# Patient Record
Sex: FEMALE | Race: WHITE | NOT HISPANIC OR LATINO | Employment: UNEMPLOYED | URBAN - METROPOLITAN AREA
[De-identification: names, ages, dates, MRNs, and addresses within clinical notes are randomized per-mention and may not be internally consistent; named-entity substitution may affect disease eponyms.]

---

## 2017-07-23 ENCOUNTER — OFFICE VISIT (OUTPATIENT)
Dept: URGENT CARE | Facility: MEDICAL CENTER | Age: 42
End: 2017-07-23
Payer: COMMERCIAL

## 2017-07-23 ENCOUNTER — APPOINTMENT (OUTPATIENT)
Dept: LAB | Facility: HOSPITAL | Age: 42
End: 2017-07-23
Payer: COMMERCIAL

## 2017-07-23 DIAGNOSIS — R30.0 DYSURIA: ICD-10-CM

## 2017-07-23 PROCEDURE — 99203 OFFICE O/P NEW LOW 30 MIN: CPT

## 2017-07-23 PROCEDURE — 87077 CULTURE AEROBIC IDENTIFY: CPT

## 2017-07-23 PROCEDURE — 87186 SC STD MICRODIL/AGAR DIL: CPT

## 2017-07-23 PROCEDURE — 87086 URINE CULTURE/COLONY COUNT: CPT

## 2017-07-25 LAB — BACTERIA UR CULT: NORMAL

## 2018-11-12 ENCOUNTER — OFFICE VISIT (OUTPATIENT)
Dept: URGENT CARE | Facility: MEDICAL CENTER | Age: 43
End: 2018-11-12
Payer: COMMERCIAL

## 2018-11-12 VITALS
TEMPERATURE: 98.7 F | OXYGEN SATURATION: 99 % | HEIGHT: 60 IN | WEIGHT: 128 LBS | DIASTOLIC BLOOD PRESSURE: 70 MMHG | BODY MASS INDEX: 25.13 KG/M2 | SYSTOLIC BLOOD PRESSURE: 90 MMHG | HEART RATE: 77 BPM | RESPIRATION RATE: 20 BRPM

## 2018-11-12 DIAGNOSIS — R30.0 DYSURIA: Primary | ICD-10-CM

## 2018-11-12 LAB
SL AMB  POCT GLUCOSE, UA: ABNORMAL
SL AMB LEUKOCYTE ESTERASE,UA: ABNORMAL
SL AMB POCT BILIRUBIN,UA: ABNORMAL
SL AMB POCT BLOOD,UA: ABNORMAL
SL AMB POCT CLARITY,UA: ABNORMAL
SL AMB POCT COLOR,UA: ABNORMAL
SL AMB POCT KETONES,UA: ABNORMAL
SL AMB POCT NITRITE,UA: ABNORMAL
SL AMB POCT PH,UA: 6
SL AMB POCT SPECIFIC GRAVITY,UA: 1.01
SL AMB POCT URINE PROTEIN: 30
SL AMB POCT UROBILINOGEN: 0.2

## 2018-11-12 PROCEDURE — 81002 URINALYSIS NONAUTO W/O SCOPE: CPT | Performed by: PHYSICIAN ASSISTANT

## 2018-11-12 PROCEDURE — 99213 OFFICE O/P EST LOW 20 MIN: CPT | Performed by: PHYSICIAN ASSISTANT

## 2018-11-12 RX ORDER — NITROFURANTOIN 25; 75 MG/1; MG/1
100 CAPSULE ORAL 2 TIMES DAILY
Qty: 14 CAPSULE | Refills: 0 | Status: SHIPPED | OUTPATIENT
Start: 2018-11-12 | End: 2018-11-19

## 2018-11-12 RX ORDER — ESCITALOPRAM OXALATE 10 MG/1
TABLET ORAL
COMMUNITY
Start: 2018-09-25

## 2018-11-12 RX ORDER — ESCITALOPRAM OXALATE 10 MG/1
TABLET ORAL
COMMUNITY

## 2018-11-12 NOTE — PROGRESS NOTES
[facility]        NAME: Lina Dykes is a 37 y o  female  : 1975    MRN: [id number]  DATE: 2018  TIME: 5:52 PM    Assessment and Plan   Dysuria [R30 0]  1  Dysuria  POCT urine dip    nitrofurantoin (MACROBID) 100 mg capsule         Patient Instructions     Dysuria  macrobid twice daily x 7 days  Follow up with PCP in 3-5 days  Proceed to  ER if symptoms worsen  Chief Complaint     Chief Complaint   Patient presents with    Possible UTI         History of Present Illness       38 y/o female c/o dysuria, frequency and urgency  Denies fever, chills, n/v        Review of Systems   Review of Systems   Constitutional: Negative  HENT: Negative  Eyes: Negative  Respiratory: Negative  Negative for cough, chest tightness, shortness of breath, wheezing and stridor  Cardiovascular: Negative  Negative for chest pain, palpitations and leg swelling  Genitourinary: Positive for dysuria, frequency and urgency  Negative for enuresis and flank pain           Current Medications       Current Outpatient Prescriptions:     escitalopram (LEXAPRO) 10 mg tablet, TAKE 1 TABLET BY MOUTH EVERY DAY, Disp: , Rfl:     escitalopram (LEXAPRO) 10 mg tablet, Take by mouth, Disp: , Rfl:     nitrofurantoin (MACROBID) 100 mg capsule, Take 1 capsule (100 mg total) by mouth 2 (two) times a day for 7 days, Disp: 14 capsule, Rfl: 0    Current Allergies     Allergies as of 2018 - Reviewed 2018   Allergen Reaction Noted    Bupropion Swelling 2015            The following portions of the patient's history were reviewed and updated as appropriate: allergies, current medications, past family history, past medical history, past social history, past surgical history and problem list      Past Medical History:   Diagnosis Date    Depression     Shingles        Past Surgical History:   Procedure Laterality Date     SECTION      DILATION AND CURETTAGE OF UTERUS         Family History   Problem Relation Age of Onset    Osteoporosis Mother     Hyperlipidemia Mother     Heart disease Father     Hypertension Father          Medications have been verified  Objective   BP 90/70   Pulse 77   Temp 98 7 °F (37 1 °C) (Temporal)   Resp 20   Ht 5' (1 524 m)   Wt 58 1 kg (128 lb)   LMP 11/04/2018 (Exact Date)   SpO2 99%   BMI 25 00 kg/m²        Physical Exam     Physical Exam   Constitutional: She appears well-developed and well-nourished  HENT:   Head: Normocephalic and atraumatic  Right Ear: External ear normal    Left Ear: External ear normal    Nose: Nose normal    Mouth/Throat: Oropharynx is clear and moist  No oropharyngeal exudate  Neck: Normal range of motion  Neck supple  Cardiovascular: Normal rate, regular rhythm, normal heart sounds and intact distal pulses  Pulmonary/Chest: Effort normal and breath sounds normal  No respiratory distress  She has no wheezes  She has no rales  She exhibits no tenderness  Abdominal: Soft  Bowel sounds are normal  She exhibits no distension and no mass  There is no tenderness  There is no rebound and no guarding  Lymphadenopathy:     She has no cervical adenopathy

## 2018-11-12 NOTE — PATIENT INSTRUCTIONS
Dysuria  macrobid twice daily x 7 days  Follow up with PCP in 3-5 days  Proceed to  ER if symptoms worsen  Dysuria   WHAT YOU NEED TO KNOW:   Dysuria is difficulty urinating, or pain, burning, or discomfort with urination  Dysuria is usually a symptom of another problem  DISCHARGE INSTRUCTIONS:   Return to the emergency department if:   · You have severe back, side, or abdominal pain  · You have fever and shaking chills  · You vomit several times in a row  Contact your healthcare provider if:   · Your symptoms do not go away, even after treatment  · You have questions or concerns about your condition or care  Medicines:   · Medicines  may be given to help treat a bacterial infection or help decrease bladder spasms  · Take your medicine as directed  Contact your healthcare provider if you think your medicine is not helping or if you have side effects  Tell him of her if you are allergic to any medicine  Keep a list of the medicines, vitamins, and herbs you take  Include the amounts, and when and why you take them  Bring the list or the pill bottles to follow-up visits  Carry your medicine list with you in case of an emergency  Follow up with your healthcare provider as directed: Your healthcare provider may also refer you to a urologist or nephrologist to have additional testing  Write down your questions so you remember to ask them during your visits  Manage your dysuria:   · Drink more liquids  Liquids help flush out bacteria that may be causing an infection  Ask your healthcare provider how much liquid to drink each day and which liquids are best for you  · Take sitz baths as directed  Fill a bathtub with 4 to 6 inches of warm water  You may also use a sitz bath pan that fits over a toilet  Sit in the sitz bath for 20 minutes  Do this 2 to 3 times a day, or as directed  The warm water can help decrease pain and swelling     © 2017 [employer] Information is for End User's use only and may not be sold, redistributed or otherwise used for commercial purposes  All illustrations and images included in CareNotes® are the copyrighted property of A [employer] M [employer]  or Vishal Kelley  The above information is an  only  It is not intended as medical advice for individual conditions or treatments  Talk to your doctor, nurse or pharmacist before following any medical regimen to see if it is safe and effective for you

## 2022-10-06 ENCOUNTER — HOSPITAL ENCOUNTER (EMERGENCY)
Facility: HOSPITAL | Age: 47
End: 2022-10-07
Attending: EMERGENCY MEDICINE
Payer: COMMERCIAL

## 2022-10-06 DIAGNOSIS — F10.10 ALCOHOL ABUSE: ICD-10-CM

## 2022-10-06 DIAGNOSIS — Z00.8 MEDICAL CLEARANCE FOR PSYCHIATRIC ADMISSION: ICD-10-CM

## 2022-10-06 DIAGNOSIS — F32.A DEPRESSION: Primary | ICD-10-CM

## 2022-10-06 DIAGNOSIS — R45.851 SUICIDAL IDEATIONS: ICD-10-CM

## 2022-10-06 LAB
ALBUMIN SERPL BCP-MCNC: 4.9 G/DL (ref 3.5–5)
ALP SERPL-CCNC: 49 U/L (ref 34–104)
ALT SERPL W P-5'-P-CCNC: 6 U/L (ref 7–52)
AMPHETAMINES SERPL QL SCN: NEGATIVE
ANION GAP SERPL CALCULATED.3IONS-SCNC: 14 MMOL/L (ref 4–13)
APAP SERPL-MCNC: <10 UG/ML (ref 10–20)
AST SERPL W P-5'-P-CCNC: 20 U/L (ref 13–39)
BARBITURATES UR QL: NEGATIVE
BASOPHILS # BLD AUTO: 0.02 THOUSANDS/ΜL (ref 0–0.1)
BASOPHILS NFR BLD AUTO: 0 % (ref 0–1)
BENZODIAZ UR QL: NEGATIVE
BILIRUB SERPL-MCNC: 0.25 MG/DL (ref 0.2–1)
BILIRUB UR QL STRIP: NEGATIVE
BUN SERPL-MCNC: 11 MG/DL (ref 5–25)
CALCIUM SERPL-MCNC: 9.2 MG/DL (ref 8.4–10.2)
CHLORIDE SERPL-SCNC: 108 MMOL/L (ref 96–108)
CLARITY UR: CLEAR
CO2 SERPL-SCNC: 21 MMOL/L (ref 21–32)
COCAINE UR QL: NEGATIVE
COLOR UR: COLORLESS
CREAT SERPL-MCNC: 0.75 MG/DL (ref 0.6–1.3)
EOSINOPHIL # BLD AUTO: 0.05 THOUSAND/ΜL (ref 0–0.61)
EOSINOPHIL NFR BLD AUTO: 1 % (ref 0–6)
ERYTHROCYTE [DISTWIDTH] IN BLOOD BY AUTOMATED COUNT: 13.9 % (ref 11.6–15.1)
ETHANOL EXG-MCNC: 0.13 MG/DL
ETHANOL EXG-MCNC: 0.17 MG/DL
ETHANOL EXG-MCNC: 0.21 MG/DL
ETHANOL EXG-MCNC: 0.24 MG/DL
ETHANOL SERPL-MCNC: 316 MG/DL
EXT PREG TEST URINE: NEGATIVE
EXT. CONTROL ED NAV: NORMAL
FLUAV RNA RESP QL NAA+PROBE: NEGATIVE
FLUBV RNA RESP QL NAA+PROBE: NEGATIVE
GFR SERPL CREATININE-BSD FRML MDRD: 93 ML/MIN/1.73SQ M
GLUCOSE SERPL-MCNC: 78 MG/DL (ref 65–140)
GLUCOSE UR STRIP-MCNC: NEGATIVE MG/DL
HCT VFR BLD AUTO: 40.3 % (ref 34.8–46.1)
HGB BLD-MCNC: 12.9 G/DL (ref 11.5–15.4)
HGB UR QL STRIP.AUTO: NEGATIVE
IMM GRANULOCYTES # BLD AUTO: 0.01 THOUSAND/UL (ref 0–0.2)
IMM GRANULOCYTES NFR BLD AUTO: 0 % (ref 0–2)
KETONES UR STRIP-MCNC: NEGATIVE MG/DL
LEUKOCYTE ESTERASE UR QL STRIP: NEGATIVE
LYMPHOCYTES # BLD AUTO: 1.7 THOUSANDS/ΜL (ref 0.6–4.47)
LYMPHOCYTES NFR BLD AUTO: 28 % (ref 14–44)
MCH RBC QN AUTO: 27.2 PG (ref 26.8–34.3)
MCHC RBC AUTO-ENTMCNC: 32 G/DL (ref 31.4–37.4)
MCV RBC AUTO: 85 FL (ref 82–98)
METHADONE UR QL: NEGATIVE
MONOCYTES # BLD AUTO: 0.26 THOUSAND/ΜL (ref 0.17–1.22)
MONOCYTES NFR BLD AUTO: 4 % (ref 4–12)
NEUTROPHILS # BLD AUTO: 3.99 THOUSANDS/ΜL (ref 1.85–7.62)
NEUTS SEG NFR BLD AUTO: 67 % (ref 43–75)
NITRITE UR QL STRIP: NEGATIVE
NRBC BLD AUTO-RTO: 0 /100 WBCS
OPIATES UR QL SCN: NEGATIVE
OXYCODONE+OXYMORPHONE UR QL SCN: NEGATIVE
PCP UR QL: NEGATIVE
PH UR STRIP.AUTO: 5 [PH]
PLATELET # BLD AUTO: 272 THOUSANDS/UL (ref 149–390)
PMV BLD AUTO: 9.2 FL (ref 8.9–12.7)
POTASSIUM SERPL-SCNC: 3.7 MMOL/L (ref 3.5–5.3)
PROT SERPL-MCNC: 7.9 G/DL (ref 6.4–8.4)
PROT UR STRIP-MCNC: NEGATIVE MG/DL
RBC # BLD AUTO: 4.75 MILLION/UL (ref 3.81–5.12)
RSV RNA RESP QL NAA+PROBE: NEGATIVE
SALICYLATES SERPL-MCNC: <5 MG/DL (ref 3–20)
SARS-COV-2 RNA RESP QL NAA+PROBE: NEGATIVE
SODIUM SERPL-SCNC: 143 MMOL/L (ref 135–147)
SP GR UR STRIP.AUTO: 1 (ref 1–1.03)
THC UR QL: NEGATIVE
TSH SERPL DL<=0.05 MIU/L-ACNC: 0.53 UIU/ML (ref 0.45–4.5)
UROBILINOGEN UR STRIP-ACNC: <2 MG/DL
WBC # BLD AUTO: 6.03 THOUSAND/UL (ref 4.31–10.16)

## 2022-10-06 PROCEDURE — 84443 ASSAY THYROID STIM HORMONE: CPT | Performed by: EMERGENCY MEDICINE

## 2022-10-06 PROCEDURE — 80179 DRUG ASSAY SALICYLATE: CPT | Performed by: EMERGENCY MEDICINE

## 2022-10-06 PROCEDURE — 80143 DRUG ASSAY ACETAMINOPHEN: CPT | Performed by: EMERGENCY MEDICINE

## 2022-10-06 PROCEDURE — 82075 ASSAY OF BREATH ETHANOL: CPT | Performed by: EMERGENCY MEDICINE

## 2022-10-06 PROCEDURE — 99285 EMERGENCY DEPT VISIT HI MDM: CPT | Performed by: EMERGENCY MEDICINE

## 2022-10-06 PROCEDURE — 85025 COMPLETE CBC W/AUTO DIFF WBC: CPT | Performed by: EMERGENCY MEDICINE

## 2022-10-06 PROCEDURE — 0241U HB NFCT DS VIR RESP RNA 4 TRGT: CPT | Performed by: EMERGENCY MEDICINE

## 2022-10-06 PROCEDURE — 81003 URINALYSIS AUTO W/O SCOPE: CPT | Performed by: EMERGENCY MEDICINE

## 2022-10-06 PROCEDURE — 80053 COMPREHEN METABOLIC PANEL: CPT | Performed by: EMERGENCY MEDICINE

## 2022-10-06 PROCEDURE — 81025 URINE PREGNANCY TEST: CPT | Performed by: EMERGENCY MEDICINE

## 2022-10-06 PROCEDURE — 80307 DRUG TEST PRSMV CHEM ANLYZR: CPT | Performed by: EMERGENCY MEDICINE

## 2022-10-06 PROCEDURE — 93005 ELECTROCARDIOGRAM TRACING: CPT

## 2022-10-06 PROCEDURE — 82077 ASSAY SPEC XCP UR&BREATH IA: CPT | Performed by: EMERGENCY MEDICINE

## 2022-10-06 PROCEDURE — 99285 EMERGENCY DEPT VISIT HI MDM: CPT

## 2022-10-06 PROCEDURE — 36415 COLL VENOUS BLD VENIPUNCTURE: CPT | Performed by: EMERGENCY MEDICINE

## 2022-10-06 NOTE — ED PROVIDER NOTES
History  Chief Complaint   Patient presents with    Psychiatric Evaluation     Pt was brought in by her  and her friends  +SI with plan  Plan to trap herself in her car and have carbon monoxide poisoning  No HI  Has access to guns at home  Pt has been drinking alcohol, approx 3 [facility] [employer] [facility]  24-year-old female comes in for psychiatric evaluation  Patient has a history of major depressive disorder  And has made several concerning statements to her  and friends about killing herself  Patient states that she has multiple triggers that make her depression worse  Patient states that she has access to guns at home  She also states that she would trap herself in her car in her garage die from carbon monoxide poisoning  Patient states that she has also been drinking alcohol today  Patient denies taking any other illicit drugs  Patient denies any previous inpatient admission      History provided by:  Patient   used: No    Psychiatric Evaluation  Presenting symptoms: depression, suicidal thoughts and suicidal threats    Presenting symptoms: no agitation    Patient accompanied by:  Family member ( and 2 best friends)  Degree of incapacity (severity):  Severe  Onset quality:  Gradual  Timing:  Constant  Progression:  Worsening  Chronicity:  Recurrent  Context: stressful life event    Treatment compliance: All of the time  Worsened by:  Alcohol  Ineffective treatments:  None tried  Associated symptoms: anxiety, feelings of worthlessness and poor judgment    Associated symptoms: no abdominal pain, no chest pain, no fatigue and no headaches    Risk factors: hx of mental illness        Prior to Admission Medications   Prescriptions Last Dose Informant Patient Reported?  Taking?   escitalopram (LEXAPRO) 10 mg tablet  Self Yes No   Sig: TAKE 1 TABLET BY MOUTH EVERY DAY   escitalopram (LEXAPRO) 10 mg tablet  Self Yes No   Sig: Take by mouth      Facility-Administered Medications: None       Past Medical History:   Diagnosis Date    Depression     Shingles        Past Surgical History:   Procedure Laterality Date     SECTION      DILATION AND CURETTAGE OF UTERUS         Family History   Problem Relation Age of Onset    Osteoporosis Mother     Hyperlipidemia Mother     Heart disease Father     Hypertension Father      I have reviewed and agree with the history as documented  E-Cigarette/Vaping     E-Cigarette/Vaping Substances     Social History     Tobacco Use    Smoking status: Current Every Day Smoker     Packs/day: 0 50     Years: 25 00     Pack years: 12 50     Types: Cigarettes    Smokeless tobacco: Never Used       Review of Systems   Constitutional: Negative for fatigue and fever  HENT: Negative for congestion and ear pain  Eyes: Negative for discharge and redness  Respiratory: Negative for apnea, cough, shortness of breath and wheezing  Cardiovascular: Negative for chest pain  Gastrointestinal: Negative for abdominal pain and diarrhea  Endocrine: Negative for cold intolerance and polydipsia  Genitourinary: Negative for difficulty urinating and hematuria  Musculoskeletal: Negative for arthralgias and back pain  Skin: Negative for color change and rash  Allergic/Immunologic: Negative for environmental allergies and immunocompromised state  Neurological: Negative for numbness and headaches  Hematological: Negative for adenopathy  Does not bruise/bleed easily  Psychiatric/Behavioral: Positive for suicidal ideas  Negative for agitation and behavioral problems  The patient is nervous/anxious  Physical Exam  Physical Exam  Vitals and nursing note reviewed  Constitutional:       Appearance: Normal appearance  She is well-developed  She is not toxic-appearing  HENT:      Head: Normocephalic and atraumatic        Right Ear: Tympanic membrane and external ear normal       Left Ear: Tympanic membrane and external ear normal       Nose: Nose normal  No nasal deformity or rhinorrhea  Mouth/Throat:      Dentition: Normal dentition  Pharynx: Uvula midline  Eyes:      General: Lids are normal          Right eye: No discharge  Left eye: No discharge  Conjunctiva/sclera: Conjunctivae normal       Pupils: Pupils are equal, round, and reactive to light  Neck:      Vascular: No carotid bruit or JVD  Trachea: Trachea normal    Cardiovascular:      Rate and Rhythm: Normal rate and regular rhythm  No extrasystoles are present  Chest Wall: PMI is not displaced  Pulses: Normal pulses  Pulmonary:      Effort: Pulmonary effort is normal  No accessory muscle usage or respiratory distress  Breath sounds: Normal breath sounds  No wheezing, rhonchi or rales  Abdominal:      General: Bowel sounds are normal       Palpations: Abdomen is soft  Abdomen is not rigid  There is no mass  Tenderness: There is no abdominal tenderness  There is no guarding or rebound  Musculoskeletal:      Right shoulder: No swelling, deformity or bony tenderness  Normal range of motion  Cervical back: Normal range of motion and neck supple  No deformity, tenderness or bony tenderness  Lymphadenopathy:      Cervical: No cervical adenopathy  Skin:     General: Skin is warm and dry  Findings: No rash  Neurological:      Mental Status: She is alert and oriented to person, place, and time  GCS: GCS eye subscore is 4  GCS verbal subscore is 5  GCS motor subscore is 6  Cranial Nerves: No cranial nerve deficit  Sensory: No sensory deficit  Deep Tendon Reflexes: Reflexes are normal and symmetric  Psychiatric:         Mood and Affect: Mood is depressed  Affect is labile  Speech: Speech is slurred  Behavior: Behavior is cooperative  Thought Content: Thought content includes suicidal ideation  Thought content includes suicidal plan           Judgment: Judgment is impulsive and inappropriate  Vital Signs  ED Triage Vitals   Temperature Pulse Respirations Blood Pressure SpO2   10/06/22 1423 10/06/22 1423 10/06/22 1423 10/06/22 1423 10/06/22 1423   98 °F (36 7 °C) (!) 116 20 117/79 99 %      Temp Source Heart Rate Source Patient Position - Orthostatic VS BP Location FiO2 (%)   10/06/22 1423 10/06/22 1423 10/06/22 2251 10/06/22 1423 --   Oral Monitor Lying Left arm       Pain Score       --                  Vitals:    10/06/22 1423 10/06/22 2251   BP: 117/79 102/59   Pulse: (!) 116 94   Patient Position - Orthostatic VS:  Lying         Visual Acuity      ED Medications  Medications - No data to display    Diagnostic Studies  Results Reviewed     Procedure Component Value Units Date/Time    POCT alcohol breath test [[id number]]  (Abnormal) Resulted: 10/06/22 2224    Lab Status: Edited Result - FINAL Updated: 10/06/22 2226     EXTBreath Alcohol 0 128    FLU/RSV/COVID - if FLU/RSV clinically relevant [[id number]]  (Normal) Collected: 10/06/22 2028    Lab Status: Final result Specimen: Nares from Nose Updated: 10/06/22 2109     SARS-CoV-2 Negative     INFLUENZA A PCR Negative     INFLUENZA B PCR Negative     RSV PCR Negative    Narrative:      FOR PEDIATRIC PATIENTS - copy/paste COVID Guidelines URL to browser: [email or web address]    SARS-CoV-2 assay is a Nucleic Acid Amplification assay intended for the  qualitative detection of nucleic acid from SARS-CoV-2 in nasopharyngeal  swabs  Results are for the presumptive identification of SARS-CoV-2 RNA  Positive results are indicative of infection with SARS-CoV-2, the virus  causing COVID-19, but do not rule out bacterial infection or co-infection  with other viruses  Laboratories within the [place] and its  territories are required to report all positive results to the appropriate  public health authorities   Negative results do not preclude SARS-CoV-2  infection and should not be used as the sole basis for treatment or other  patient management decisions  Negative results must be combined with  clinical observations, patient history, and epidemiological information  This test has not been FDA cleared or approved  This test has been authorized by FDA under an Emergency Use Authorization  (EUA)  This test is only authorized for the duration of time the  declaration that circumstances exist justifying the authorization of the  emergency use of an in vitro diagnostic tests for detection of SARS-CoV-2  virus and/or diagnosis of COVID-19 infection under section 564(b)(1) of  the Act, 21 U  S C  110XGX-8(Q)(8), unless the authorization is terminated  or revoked sooner  The test has been validated but independent review by FDA  and CLIA is pending  Test performed using [employer] GeneXpert: This RT-PCR assay targets N2,  a region unique to SARS-CoV-2  A conserved region in the E-gene was chosen  for pan-Sarbecovirus detection which includes SARS-CoV-2  According to CMS-2020-01-R, this platform meets the definition of high-throughput technology  POCT alcohol breath test [[id number]]  (Normal) Resulted: 10/06/22 2028    Lab Status: Final result Updated: 10/06/22 2028     EXTBreath Alcohol 0 168    POCT alcohol breath test [[id number]]  (Normal) Resulted: 10/06/22 1805    Lab Status: Final result Updated: 10/06/22 1806     EXTBreath Alcohol 0 208    Rapid drug screen, urine [[id number]]  (Normal) Collected: 10/06/22 1611    Lab Status: Final result Specimen: Urine, Clean Catch Updated: 10/06/22 1643     Amph/Meth UR Negative     Barbiturate Ur Negative     Benzodiazepine Urine Negative     Cocaine Urine Negative     Methadone Urine Negative     Opiate Urine Negative     PCP Ur Negative     THC Urine Negative     Oxycodone Urine Negative    Narrative:      FOR MEDICAL PURPOSES ONLY  IF CONFIRMATION NEEDED PLEASE CONTACT THE LAB WITHIN 5 DAYS      Drug Screen Cutoff Levels:  AMPHETAMINE/METHAMPHETAMINES  1000 ng/mL  BARBITURATES     200 ng/mL  BENZODIAZEPINES     200 ng/mL  COCAINE      300 ng/mL  METHADONE      300 ng/mL  OPIATES      300 ng/mL  PHENCYCLIDINE     25 ng/mL  THC       50 ng/mL  OXYCODONE      100 ng/mL    TSH [[id number]]  (Normal) Collected: 10/06/22 1527    Lab Status: Final result Specimen: Blood from Arm, Right Updated: 10/06/22 1641     TSH 3RD GENERATON 0 534 uIU/mL     Narrative:      Patients undergoing fluorescein dye angiography may retain small amounts of fluorescein in the body for 48-72 hours post procedure  Samples containing fluorescein can produce falsely depressed TSH values  If the patient had this procedure,a specimen should be resubmitted post fluorescein clearance        UA w Reflex to Microscopic w Reflex to Culture [[id number]] Collected: 10/06/22 1611    Lab Status: Final result Specimen: Urine, Clean Catch Updated: 10/06/22 1621     Color, UA Colorless     Clarity, UA Clear     Specific [place], UA 1 003     pH, UA 5 0     Leukocytes, UA Negative     Nitrite, UA Negative     Protein, UA Negative mg/dl      Glucose, UA Negative mg/dl      Ketones, UA Negative mg/dl      Urobilinogen, UA <2 0 mg/dl      Bilirubin, UA Negative     Occult Blood, UA Negative    POCT pregnancy, urine [[id number]]  (Normal) Resulted: 10/06/22 1610    Lab Status: Final result Updated: 10/06/22 1611     EXT PREG TEST UR (Ref: Negative) negative     Control valid    Ethanol [[id number]]  (Abnormal) Collected: 10/06/22 1527    Lab Status: Final result Specimen: Blood from Arm, Right Updated: 10/06/22 1552     Ethanol Lvl 316 mg/dL     Comprehensive metabolic panel [[id number]]  (Abnormal) Collected: 10/06/22 1527    Lab Status: Final result Specimen: Blood from Arm, Right Updated: 10/06/22 1552     Sodium 143 mmol/L      Potassium 3 7 mmol/L      Chloride 108 mmol/L      CO2 21 mmol/L      ANION GAP 14 mmol/L      BUN 11 mg/dL      Creatinine 0 75 mg/dL      Glucose 78 mg/dL      Calcium 9 2 mg/dL      AST 20 U/L      ALT 6 U/L      Alkaline Phosphatase 49 U/L      Total Protein 7 9 g/dL      Albumin 4 9 g/dL      Total Bilirubin 0 25 mg/dL      eGFR 93 ml/min/1 73sq m     Narrative:      [place] guidelines for Chronic Kidney Disease (CKD):     Stage 1 with normal or high GFR (GFR > 90 mL/min/1 73 square meters)    Stage 2 Mild CKD (GFR = 60-89 mL/min/1 73 square meters)    Stage 3A Moderate CKD (GFR = 45-59 mL/min/1 73 square meters)    Stage 3B Moderate CKD (GFR = 30-44 mL/min/1 73 square meters)    Stage 4 Severe CKD (GFR = 15-29 mL/min/1 73 square meters)    Stage 5 End Stage CKD (GFR <15 mL/min/1 73 square meters)  Note: GFR calculation is accurate only with a steady state creatinine    Salicylate level [[id number]]  (Normal) Collected: 10/06/22 1527    Lab Status: Final result Specimen: Blood from Arm, Right Updated: 41/19/12 0115     Salicylate Lvl <5 mg/dL     Acetaminophen level-If concentration is detectable, please discuss with medical  on call   [[id number]]  (Abnormal) Collected: 10/06/22 1527    Lab Status: Final result Specimen: Blood from Arm, Right Updated: 10/06/22 1552     Acetaminophen Level <10 ug/mL     CBC and differential [[id number]] Collected: 10/06/22 1527    Lab Status: Final result Specimen: Blood from Arm, Right Updated: 10/06/22 1536     WBC 6 03 Thousand/uL      RBC 4 75 Million/uL      Hemoglobin 12 9 g/dL      Hematocrit 40 3 %      MCV 85 fL      MCH 27 2 pg      MCHC 32 0 g/dL      RDW 13 9 %      MPV 9 2 fL      Platelets 899 Thousands/uL      nRBC 0 /100 WBCs      Neutrophils Relative 67 %      Immat GRANS % 0 %      Lymphocytes Relative 28 %      Monocytes Relative 4 %      Eosinophils Relative 1 %      Basophils Relative 0 %      Neutrophils Absolute 3 99 Thousands/µL      Immature Grans Absolute 0 01 Thousand/uL      Lymphocytes Absolute 1 70 Thousands/µL      Monocytes Absolute 0 26 Thousand/µL Eosinophils Absolute 0 05 Thousand/µL      Basophils Absolute 0 02 Thousands/µL     POCT alcohol breath test [[id number]]  (Normal) Resulted: 10/06/22 1512    Lab Status: Final result Updated: 10/06/22 1512     EXTBreath Alcohol 0 238                 No orders to display              Procedures  ECG 12 Lead Documentation Only    Date/Time: 10/6/2022 3:17 PM  Performed by: Natalia Ramírez DO  Authorized by: Natalia Ramírez DO     Patient location:  ED  Rate:     ECG rate:  107  Rhythm:     Rhythm: sinus tachycardia               ED Course  ED Course as of 10/06/22 2341   Thu Oct 06, 2022   2305 Extensive discussion with  and 2 friends they are very concerned about patient's mental health  Made bleeding she needs to go in inpatient  Unfortunately patient remain intoxicated past 2300 hours tonight she will not be able to be seen by crisis until the morning  I have discussed her with the overnight physician  We both are in agreement that patient needs to be seen and evaluated by crisis and will not need inpatient care whether she signs a 201 or be initiated 302  MDM  Number of Diagnoses or Management Options  Depression: new and requires workup  Suicidal ideations: new and requires workup     Amount and/or Complexity of Data Reviewed  Clinical lab tests: ordered and reviewed  Tests in the radiology section of CPT®: ordered and reviewed  Tests in the medicine section of CPT®: ordered and reviewed    Patient Progress  Patient progress: other (comment)      Disposition  Final diagnoses:   Depression   Suicidal ideations     Time reflects when diagnosis was documented in both MDM as applicable and the Disposition within this note     Time User Action Codes Description Comment    10/6/2022 11:07 PM Maryse Anderson Matt Retort  A] Depression     10/6/2022 11:07 PM Kong Mika K Add [[id number] [id number]] Suicidal ideations       ED Disposition     None      Follow-up Information    None         Patient's Medications   Discharge Prescriptions    No medications on file       No discharge procedures on file      PDMP Review     None          ED Provider  Electronically Signed by           Jeannine Rivera DO  10/06/22 6553

## 2022-10-06 NOTE — LETTER
[address] [place] [address]  Dept: [phone]      EMTALA TRANSFER CONSENT    NAME Barb Shaw                                         1975                              MRN [id number]    I have been informed of my rights regarding examination, treatment, and transfer   by Dr Stephon Lewis DO    Benefits: Specialized equipment and/or services available at the receiving facility (Include comment)________________________, Continuity of care, Other benefits (Include comment)_______________________ (inpatient mental health 201)    Risks: Potential for delay in receiving treatment, Increased discomfort during transfer, Potential deterioration of medical condition, Possible worsening of condition or death during transfer      Consent for Transfer:  I acknowledge that my medical condition has been evaluated and explained to me by the emergency department physician or other qualified medical person and/or my attending physician, who has recommended that I be transferred to the service of  Accepting Physician: Dr Sera Ramírez at [address] [place] [address] Name, [address] : [address]  The above potential benefits of such transfer, the potential risks associated with such transfer, and the probable risks of not being transferred have been explained to me, and I fully understand them  The doctor has explained that, in my case, the benefits of transfer outweigh the risks  I agree to be transferred  I authorize the performance of emergency medical procedures and treatments upon me in both transit and upon arrival at the receiving facility  Additionally, I authorize the release of any and all medical records to the receiving facility and request they be transported with me, if possible  I understand that the safest mode of transportation during a medical emergency is an ambulance and that the Hospital advocates the use of this mode of transport  Risks of traveling to the receiving facility by car, including absence of medical control, life sustaining equipment, such as oxygen, and medical personnel has been explained to me and I fully understand them  (AMENA CORRECT BOX BELOW)  [X]  I consent to the stated transfer and to be transported by ambulance/helicopter  [  ]  I consent to the stated transfer, but refuse transportation by ambulance and accept full responsibility for my transportation by car  I understand the risks of non-ambulance transfers and I exonerate the Hospital and its staff from any deterioration in my condition that results from this refusal         X___________________________________________    DATE  10/07/22  TIME________  Signature of patient or legally responsible individual signing on patient behalf           RELATIONSHIP TO PATIENT_________________________          Provider Certification    NAME Camryn Ellison                                        [facility] 1975                              MRN [id number]    A medical screening exam was performed on the above named patient  Based on the examination:    Condition Necessitating Transfer The primary encounter diagnosis was Depression  Diagnoses of Suicidal ideations, Medical clearance for psychiatric admission, and Alcohol abuse were also pertinent to this visit      Patient Condition: The patient has been stabilized such that within reasonable medical probability, no material deterioration of the patient condition or the condition of the unborn child(diana) is likely to result from the transfer    Reason for Transfer: Level of Care needed not available at this facility, No bed available at level of patient's needs, Other (Include comment)____________________ (inpatient mental health 201)    Transfer Requirements: [address]   · Space available and qualified personnel available for treatment as acknowledged by Crisis [phone]  · Agreed to accept transfer and to provide appropriate medical treatment as acknowledged by       Dr Ronnie Bliss  · Appropriate medical records of the examination and treatment of the patient are provided at the time of transfer   [address] [facility], [address] _______  · Transfer will be performed by qualified personnel from [address] [place] [address]  and appropriate transfer equipment as required, including the use of necessary and appropriate life support measures  Provider Certification: I have examined the patient and explained the following risks and benefits of being transferred/refusing transfer to the patient/family:  General risk, such as traffic hazards, adverse weather conditions, rough terrain or turbulence, possible failure of equipment (including vehicle or aircraft), or consequences of actions of persons outside the control of the transport personnel, Unanticipated needs of medical equipment and personnel during transport, Risk of worsening condition, The possibility of a transport vehicle being unavailable, The patient is stable for psychiatric transfer because they are medically stable, and is protected from harming him/herself or others during transport      Based on these reasonable risks and benefits to the patient and/or the unborn child(diana), and based upon the information available at the time of the patients examination, I certify that the medical benefits reasonably to be expected from the provision of appropriate medical treatments at another medical facility outweigh the increasing risks, if any, to the individuals medical condition, and in the case of labor to the unborn child, from effecting the transfer      X____________________________________________ DATE 10/07/22        TIME_______      ORIGINAL - SEND TO MEDICAL RECORDS   COPY - SEND WITH PATIENT DURING TRANSFER

## 2022-10-06 NOTE — ED NOTES
[facility] [address]  10/06/22 [facility]  10/06/22 7478

## 2022-10-07 ENCOUNTER — HOSPITAL ENCOUNTER (INPATIENT)
Facility: HOSPITAL | Age: 47
LOS: 5 days | Discharge: HOME | DRG: 885 | End: 2022-10-12
Attending: STUDENT IN AN ORGANIZED HEALTH CARE EDUCATION/TRAINING PROGRAM | Admitting: STUDENT IN AN ORGANIZED HEALTH CARE EDUCATION/TRAINING PROGRAM
Payer: COMMERCIAL

## 2022-10-07 VITALS
DIASTOLIC BLOOD PRESSURE: 63 MMHG | RESPIRATION RATE: 16 BRPM | BODY MASS INDEX: 25 KG/M2 | HEIGHT: 60 IN | HEART RATE: 93 BPM | TEMPERATURE: 98 F | SYSTOLIC BLOOD PRESSURE: 119 MMHG | OXYGEN SATURATION: 100 %

## 2022-10-07 DIAGNOSIS — F32.2 SEVERE MAJOR DEPRESSION WITHOUT PSYCHOTIC FEATURES (HCC): Primary | ICD-10-CM

## 2022-10-07 DIAGNOSIS — Z78.9 ALCOHOL USE: ICD-10-CM

## 2022-10-07 DIAGNOSIS — F17.200 NICOTINE DEPENDENCE: ICD-10-CM

## 2022-10-07 DIAGNOSIS — Z00.8 MEDICAL CLEARANCE FOR PSYCHIATRIC ADMISSION: ICD-10-CM

## 2022-10-07 DIAGNOSIS — F10.10 ALCOHOL ABUSE: ICD-10-CM

## 2022-10-07 LAB
ATRIAL RATE: 107 BPM
P AXIS: 61 DEGREES
PR INTERVAL: 130 MS
QRS AXIS: 71 DEGREES
QRSD INTERVAL: 82 MS
QT INTERVAL: 346 MS
QTC INTERVAL: 461 MS
T WAVE AXIS: 0 DEGREES
VENTRICULAR RATE: 107 BPM

## 2022-10-07 PROCEDURE — 93010 ELECTROCARDIOGRAM REPORT: CPT | Performed by: INTERNAL MEDICINE

## 2022-10-07 RX ORDER — LORAZEPAM 2 MG/ML
2 INJECTION INTRAMUSCULAR EVERY 6 HOURS PRN
Status: CANCELLED | OUTPATIENT
Start: 2022-10-07

## 2022-10-07 RX ORDER — MAGNESIUM HYDROXIDE/ALUMINUM HYDROXICE/SIMETHICONE 120; 1200; 1200 MG/30ML; MG/30ML; MG/30ML
30 SUSPENSION ORAL EVERY 4 HOURS PRN
Status: DISCONTINUED | OUTPATIENT
Start: 2022-10-07 | End: 2022-10-12 | Stop reason: HOSPADM

## 2022-10-07 RX ORDER — OLANZAPINE 2.5 MG/1
5 TABLET ORAL
Status: CANCELLED | OUTPATIENT
Start: 2022-10-07

## 2022-10-07 RX ORDER — LORAZEPAM 2 MG/ML
2 INJECTION INTRAMUSCULAR EVERY 6 HOURS PRN
Status: DISCONTINUED | OUTPATIENT
Start: 2022-10-07 | End: 2022-10-12 | Stop reason: HOSPADM

## 2022-10-07 RX ORDER — IBUPROFEN 400 MG/1
400 TABLET ORAL EVERY 4 HOURS PRN
Status: CANCELLED | OUTPATIENT
Start: 2022-10-07

## 2022-10-07 RX ORDER — LORAZEPAM 1 MG/1
2 TABLET ORAL EVERY 4 HOURS PRN
Status: CANCELLED | OUTPATIENT
Start: 2022-10-07

## 2022-10-07 RX ORDER — BISACODYL 10 MG
10 SUPPOSITORY, RECTAL RECTAL DAILY PRN
Status: CANCELLED | OUTPATIENT
Start: 2022-10-07

## 2022-10-07 RX ORDER — OLANZAPINE 10 MG/1
5 INJECTION, POWDER, LYOPHILIZED, FOR SOLUTION INTRAMUSCULAR
Status: DISCONTINUED | OUTPATIENT
Start: 2022-10-07 | End: 2022-10-12 | Stop reason: HOSPADM

## 2022-10-07 RX ORDER — IBUPROFEN 600 MG/1
600 TABLET ORAL EVERY 6 HOURS PRN
Status: CANCELLED | OUTPATIENT
Start: 2022-10-07

## 2022-10-07 RX ORDER — BENZTROPINE MESYLATE 1 MG/1
1 TABLET ORAL
Status: DISCONTINUED | OUTPATIENT
Start: 2022-10-07 | End: 2022-10-12 | Stop reason: HOSPADM

## 2022-10-07 RX ORDER — DIPHENHYDRAMINE HYDROCHLORIDE 50 MG/ML
50 INJECTION INTRAMUSCULAR; INTRAVENOUS EVERY 6 HOURS PRN
Status: DISCONTINUED | OUTPATIENT
Start: 2022-10-07 | End: 2022-10-12 | Stop reason: HOSPADM

## 2022-10-07 RX ORDER — OLANZAPINE 10 MG/1
5 INJECTION, POWDER, LYOPHILIZED, FOR SOLUTION INTRAMUSCULAR
Status: CANCELLED | OUTPATIENT
Start: 2022-10-07

## 2022-10-07 RX ORDER — FOLIC ACID 1 MG/1
1 TABLET ORAL DAILY
Status: DISCONTINUED | OUTPATIENT
Start: 2022-10-07 | End: 2022-10-07 | Stop reason: HOSPADM

## 2022-10-07 RX ORDER — BISACODYL 10 MG
10 SUPPOSITORY, RECTAL RECTAL DAILY PRN
Status: DISCONTINUED | OUTPATIENT
Start: 2022-10-07 | End: 2022-10-07

## 2022-10-07 RX ORDER — OLANZAPINE 2.5 MG/1
2.5 TABLET ORAL
Status: CANCELLED | OUTPATIENT
Start: 2022-10-07

## 2022-10-07 RX ORDER — LANOLIN ALCOHOL/MO/W.PET/CERES
100 CREAM (GRAM) TOPICAL DAILY
Status: CANCELLED | OUTPATIENT
Start: 2022-10-07

## 2022-10-07 RX ORDER — HYDROXYZINE HYDROCHLORIDE 25 MG/1
50 TABLET, FILM COATED ORAL
Status: CANCELLED | OUTPATIENT
Start: 2022-10-07

## 2022-10-07 RX ORDER — LANOLIN ALCOHOL/MO/W.PET/CERES
100 CREAM (GRAM) TOPICAL DAILY
Status: DISCONTINUED | OUTPATIENT
Start: 2022-10-07 | End: 2022-10-07 | Stop reason: HOSPADM

## 2022-10-07 RX ORDER — ESCITALOPRAM OXALATE 10 MG/1
10 TABLET ORAL DAILY
Status: DISCONTINUED | OUTPATIENT
Start: 2022-10-07 | End: 2022-10-08

## 2022-10-07 RX ORDER — NICOTINE 21 MG/24HR
1 PATCH, TRANSDERMAL 24 HOURS TRANSDERMAL DAILY
Status: DISCONTINUED | OUTPATIENT
Start: 2022-10-07 | End: 2022-10-10

## 2022-10-07 RX ORDER — HYDROXYZINE HYDROCHLORIDE 25 MG/1
25 TABLET, FILM COATED ORAL
Status: DISCONTINUED | OUTPATIENT
Start: 2022-10-07 | End: 2022-10-12 | Stop reason: HOSPADM

## 2022-10-07 RX ORDER — HYDROXYZINE HYDROCHLORIDE 25 MG/1
25 TABLET, FILM COATED ORAL
Status: CANCELLED | OUTPATIENT
Start: 2022-10-07

## 2022-10-07 RX ORDER — TRAZODONE HYDROCHLORIDE 50 MG/1
50 TABLET ORAL
Status: CANCELLED | OUTPATIENT
Start: 2022-10-07

## 2022-10-07 RX ORDER — POLYETHYLENE GLYCOL 3350 17 G/17G
17 POWDER, FOR SOLUTION ORAL DAILY PRN
Status: CANCELLED | OUTPATIENT
Start: 2022-10-07

## 2022-10-07 RX ORDER — AMOXICILLIN 250 MG
1 CAPSULE ORAL DAILY PRN
Status: CANCELLED | OUTPATIENT
Start: 2022-10-07

## 2022-10-07 RX ORDER — OLANZAPINE 5 MG/1
5 TABLET ORAL
Status: DISCONTINUED | OUTPATIENT
Start: 2022-10-07 | End: 2022-10-12 | Stop reason: HOSPADM

## 2022-10-07 RX ORDER — BENZTROPINE MESYLATE 1 MG/ML
1 INJECTION INTRAMUSCULAR; INTRAVENOUS
Status: CANCELLED | OUTPATIENT
Start: 2022-10-07

## 2022-10-07 RX ORDER — HYDROXYZINE 50 MG/1
100 TABLET, FILM COATED ORAL
Status: DISCONTINUED | OUTPATIENT
Start: 2022-10-07 | End: 2022-10-12 | Stop reason: HOSPADM

## 2022-10-07 RX ORDER — OLANZAPINE 10 MG/1
2.5 INJECTION, POWDER, LYOPHILIZED, FOR SOLUTION INTRAMUSCULAR
Status: CANCELLED | OUTPATIENT
Start: 2022-10-07

## 2022-10-07 RX ORDER — DIPHENHYDRAMINE HYDROCHLORIDE 50 MG/ML
50 INJECTION INTRAMUSCULAR; INTRAVENOUS EVERY 6 HOURS PRN
Status: CANCELLED | OUTPATIENT
Start: 2022-10-07

## 2022-10-07 RX ORDER — IBUPROFEN 400 MG/1
800 TABLET ORAL EVERY 8 HOURS PRN
Status: CANCELLED | OUTPATIENT
Start: 2022-10-07

## 2022-10-07 RX ORDER — OLANZAPINE 10 MG/1
2.5 INJECTION, POWDER, LYOPHILIZED, FOR SOLUTION INTRAMUSCULAR
Status: DISCONTINUED | OUTPATIENT
Start: 2022-10-07 | End: 2022-10-12 | Stop reason: HOSPADM

## 2022-10-07 RX ORDER — FOLIC ACID 1 MG/1
1 TABLET ORAL DAILY
Status: DISCONTINUED | OUTPATIENT
Start: 2022-10-08 | End: 2022-10-12 | Stop reason: HOSPADM

## 2022-10-07 RX ORDER — AMOXICILLIN 250 MG
1 CAPSULE ORAL DAILY PRN
Status: DISCONTINUED | OUTPATIENT
Start: 2022-10-07 | End: 2022-10-12 | Stop reason: HOSPADM

## 2022-10-07 RX ORDER — BISACODYL 10 MG
10 SUPPOSITORY, RECTAL RECTAL DAILY PRN
Status: DISCONTINUED | OUTPATIENT
Start: 2022-10-07 | End: 2022-10-12 | Stop reason: HOSPADM

## 2022-10-07 RX ORDER — TRAZODONE HYDROCHLORIDE 50 MG/1
50 TABLET ORAL
Status: DISCONTINUED | OUTPATIENT
Start: 2022-10-07 | End: 2022-10-12 | Stop reason: HOSPADM

## 2022-10-07 RX ORDER — LORAZEPAM 1 MG/1
2 TABLET ORAL EVERY 4 HOURS PRN
Status: DISCONTINUED | OUTPATIENT
Start: 2022-10-07 | End: 2022-10-12 | Stop reason: HOSPADM

## 2022-10-07 RX ORDER — MINERAL OIL AND PETROLATUM 150; 830 MG/G; MG/G
1 OINTMENT OPHTHALMIC
Status: DISCONTINUED | OUTPATIENT
Start: 2022-10-07 | End: 2022-10-12 | Stop reason: HOSPADM

## 2022-10-07 RX ORDER — LANOLIN ALCOHOL/MO/W.PET/CERES
100 CREAM (GRAM) TOPICAL DAILY
Status: DISCONTINUED | OUTPATIENT
Start: 2022-10-08 | End: 2022-10-12 | Stop reason: HOSPADM

## 2022-10-07 RX ORDER — HYDROXYZINE 50 MG/1
50 TABLET, FILM COATED ORAL
Status: DISCONTINUED | OUTPATIENT
Start: 2022-10-07 | End: 2022-10-12 | Stop reason: HOSPADM

## 2022-10-07 RX ORDER — FOLIC ACID 1 MG/1
1 TABLET ORAL DAILY
Status: CANCELLED | OUTPATIENT
Start: 2022-10-07

## 2022-10-07 RX ORDER — POLYETHYLENE GLYCOL 3350 17 G/17G
17 POWDER, FOR SOLUTION ORAL DAILY PRN
Status: DISCONTINUED | OUTPATIENT
Start: 2022-10-07 | End: 2022-10-12 | Stop reason: HOSPADM

## 2022-10-07 RX ORDER — BENZTROPINE MESYLATE 0.5 MG/1
1 TABLET ORAL
Status: CANCELLED | OUTPATIENT
Start: 2022-10-07

## 2022-10-07 RX ORDER — BENZTROPINE MESYLATE 1 MG/ML
1 INJECTION INTRAMUSCULAR; INTRAVENOUS
Status: DISCONTINUED | OUTPATIENT
Start: 2022-10-07 | End: 2022-10-12 | Stop reason: HOSPADM

## 2022-10-07 RX ORDER — IBUPROFEN 600 MG/1
600 TABLET ORAL EVERY 6 HOURS PRN
Status: DISCONTINUED | OUTPATIENT
Start: 2022-10-07 | End: 2022-10-12 | Stop reason: HOSPADM

## 2022-10-07 RX ORDER — NICOTINE 21 MG/24HR
1 PATCH, TRANSDERMAL 24 HOURS TRANSDERMAL DAILY
Status: CANCELLED | OUTPATIENT
Start: 2022-10-07

## 2022-10-07 RX ORDER — OLANZAPINE 2.5 MG/1
2.5 TABLET ORAL
Status: DISCONTINUED | OUTPATIENT
Start: 2022-10-07 | End: 2022-10-12 | Stop reason: HOSPADM

## 2022-10-07 RX ORDER — MINERAL OIL AND PETROLATUM 150; 830 MG/G; MG/G
1 OINTMENT OPHTHALMIC
Status: CANCELLED | OUTPATIENT
Start: 2022-10-07

## 2022-10-07 RX ORDER — MAGNESIUM HYDROXIDE/ALUMINUM HYDROXICE/SIMETHICONE 120; 1200; 1200 MG/30ML; MG/30ML; MG/30ML
30 SUSPENSION ORAL EVERY 4 HOURS PRN
Status: CANCELLED | OUTPATIENT
Start: 2022-10-07

## 2022-10-07 RX ORDER — IBUPROFEN 400 MG/1
400 TABLET ORAL EVERY 4 HOURS PRN
Status: DISCONTINUED | OUTPATIENT
Start: 2022-10-07 | End: 2022-10-12 | Stop reason: HOSPADM

## 2022-10-07 RX ORDER — IBUPROFEN 800 MG/1
800 TABLET ORAL EVERY 8 HOURS PRN
Status: DISCONTINUED | OUTPATIENT
Start: 2022-10-07 | End: 2022-10-12 | Stop reason: HOSPADM

## 2022-10-07 RX ORDER — HYDROXYZINE HYDROCHLORIDE 25 MG/1
100 TABLET, FILM COATED ORAL
Status: CANCELLED | OUTPATIENT
Start: 2022-10-07

## 2022-10-07 RX ADMIN — ESCITALOPRAM OXALATE 10 MG: 10 TABLET ORAL at 14:25

## 2022-10-07 NOTE — ED NOTES
Insurance Authorization for admission:   Phone call placed to Ad Summos / Niya Cabrera  Phone number: [phone] ext  [phone] (as no call back was received as promised)  Confidential voice mail message was left for Maria L CHERY  Requesting a response to the request for prior authorization  Clinical details supporting the need for same were left on her voice mail  Requested a return call to Crisis Worker and/or Luz Maria ANTOINE  Contact information provided for both  * days approved  Level of care: inpatient mental health 201  Review on   Authorization #:

## 2022-10-07 NOTE — ED NOTES
EMTALA completed and signed by patient and provider  Transfer packet prepared and placed in chart rack at RN station (1:1 has clipboard chart)  Patient voices understanding and support of disposition / plan

## 2022-10-07 NOTE — NURSING NOTE
Pt arrived to the unit with the following:    Bedside:  Book  Note w/ phone #'s  Eyeglasses x2 w/ case  Contact case  Contact solution  Bra  Socks x3pr  Grippy Socks x1  Underwear x4  3/4 sleeve knit shirt grey  T-shirt grey "Flip Flops   "  Leggings black  L/S knit shirt grey  T-shirt [employer]  Leggings black cammo  Sweatshirt black/blue cammo (no drawstring or hicks)    Locker:  Neon Green UA duffel bag  Sneakers Adidas black/pink  Athletic pants black (drawstring)  Yasmin leggings charcoal (drawstring)  Turtleneck black/white stripes (drawstring)  Turtleneck black (drawstring)    Vape red    **Black zippered bag containing assorted toiletries   **(to remain in locker when not in use)**        PT CONFIRMS NO CELL PHONE, WALLET OR OTHER VALUABLES PRESENT UPON ADMISSION    All items inspected for contraband/safety and distributed as noted above

## 2022-10-07 NOTE — PLAN OF CARE
Problem: SELF HARM/SUICIDALITY  Goal: Will have no self-injury during hospital stay  Description: INTERVENTIONS:  - Q 15 MINUTES: Routine safety checks  - Q WAKING SHIFT & PRN: Assess risk to determine if routine checks are adequate to maintain patient safety  - Encourage patient to participate actively in care by formulating a plan to combat response to suicidal ideation, identify supports and resources  Outcome: Progressing     Problem: DEPRESSION  Goal: Will be euthymic at discharge  Description: INTERVENTIONS:  - Administer medication as ordered  - Provide emotional support via 1:1 interaction with staff  - Encourage involvement in milieu/groups/activities  - Monitor for social isolation  Outcome: Progressing     Problem: ANXIETY  Goal: Will report anxiety at manageable levels  Description: INTERVENTIONS:  - Administer medication as ordered  - Teach and encourage coping skills  - Provide emotional support  - Assess patient/family for anxiety and ability to cope  Outcome: Progressing  Goal: By discharge: Patient will verbalize 2 strategies to deal with anxiety  Description: Interventions:  - Identify any obvious source/trigger to anxiety  - Staff will assist patient in applying identified coping technique/skills  - Encourage attendance of scheduled groups and activities  Outcome: Progressing     Problem: SLEEP DISTURBANCE  Goal: Will exhibit normal sleeping pattern  Description: Interventions:  -  Assess the patients sleep pattern, noting recent changes  - Administer medication as ordered  - Decrease environmental stimuli, including noise, as appropriate during the night  - Encourage the patient to actively participate in unit groups and or exercise during the day to enhance ability to achieve adequate sleep at night  - Assess the patient, in the morning, encouraging a description of sleep experience  Outcome: Progressing     Problem: Ineffective Coping  Goal: Participates in unit activities  Description: Interventions:  - Provide therapeutic environment   - Provide required programming   - Redirect inappropriate behaviors   Outcome: Progressing     Problem: DISCHARGE PLANNING  Goal: Discharge to home or other facility with appropriate resources  Description: INTERVENTIONS:  - Identify barriers to discharge w/patient and caregiver  - Arrange for needed discharge resources and transportation as appropriate  - Identify discharge learning needs (meds, wound care, etc )  - Arrange for interpretive services to assist at discharge as needed  - Refer to Case Management Department for coordinating discharge planning if the patient needs post-hospital services based on physician/advanced practitioner order or complex needs related to functional status, cognitive ability, or social support system  Outcome: Progressing
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
## 2022-10-07 NOTE — ED NOTES
Patient is accepted at [facility]  Patient is accepted by Dr Jaziel Sanabria with orders entered by Delores James PA-C per Eloy Rg  Transportation is arranged with [employer]  Transportation is scheduled for 1300 with [facility] (Intake advised)  Patient may go to the floor with pick-up after 1300  Nurse report is to be called to [phone] prior to patient transfer

## 2022-10-07 NOTE — ED NOTES
Insurance Authorization for admission:   Phone call placed to [employer] / Virgilio Francisco  Phone number: [phone]  Spoke to: niko Gerber's  Clinical Care Advocate required to complete the call  Maria L Fung (#[phone]  [phone])  He expects she will call back within 2 hours  * days approved  Level of care: inpatient mental health 201  Review on   Authorization #:          EVS (Eligibility Verification System) [facility] - [id number]  Automated system indicates: not eligible

## 2022-10-07 NOTE — ED CARE HANDOFF
Emergency Department Sign Out Note        Sign out and transfer of care from Dr Yoandy Sandoval  See Separate Emergency Department note  The patient, Damian Powell, was evaluated by the previous provider for follow-up crisis consultation  Workup Completed: Follow-up crisis consultation    ED Course / Workup Pending (followup): Patient medically cleared for inpatient psychiatric placement        Patient signed a SHERINE Lopez upheld/co signed                                  ED Course as of 10/07/22 0840   Fri Oct 07, 2022   8323 Patient signed a 201     Procedures  MDM        Disposition  Final diagnoses:   Depression   Suicidal ideations     Time reflects when diagnosis was documented in both MDM as applicable and the Disposition within this note     Time User Action Codes Description Comment    10/6/2022 11:07 PM Maryse Lozano Add Matt Retort  A] Depression     10/6/2022 11:07 PM Livan Wyatt Add [R88 184] Suicidal ideations       ED Disposition     None      Follow-up Information    None       Patient's Medications   Discharge Prescriptions    No medications on file     No discharge procedures on file         ED Provider  Electronically Signed by     Liv Tiwari DO  10/07/22 2500

## 2022-10-07 NOTE — ED NOTES
Patient is a 50 yr old female who arrived to the ED via friends / family yesterday seeking support for mental health concerns related to expression of suicidal ideas  On arrival, patient was intoxicated and reportedly was reluctant to exit the vehicle and sign in, but with encouragement, she did so  Alcohol level was elevated, thereby delaying the crisis evaluation  Patient is now clinically sober, alert, oriented, pleasant, and cooperative  Patient does endorse suicidal ideas, however, she denied plan when questioned  She stated that she has been dealing with depression for many years ("all my life, really") and it is just progressively getting worse with no identifiable precipitant  When advised that documentation from arrival indicated a plan for carbon monoxide poisoning, she did not appear surprised by this and nodded as if to indicate that it was possible she said this while intoxicated, but had limited memory  She confirmed that 2 years ago she contemplated suicide by the same method and actually entered the car with the intent for carbon monoxide poisoning, but then "realized it was stupid" and exited the vehicle  She denies any additional past attempts and denies any self injurious behaviors  She denies any homicidal ideas, plan, intent; denies violence or aggression  She does endorse depression with increased sleep and overall feelings of sadness  She reports that most of what she does participate in is socially motivated and if she did not have others relying on her or motivating her, she would not do anything  She reports that the is unable to sleep well at night, accumulating approximately 4 hours of broken sleep, then she takes the children to school and sleeps much of the day away, adding another 3 hours of sleep on average  She reports that she has no appetite and does not eat well, often eating in the afternoon for the first time daily  She reports stable weight    She reports binge drinking 2 days per week  She reports drinking Mandy Cruise, consuming 4-6 drinks at a time  She commented, "I have no 'off' switch"  Patient denies any auditory or visual hallucinations  She denies any delusions, preoccupations, or delusional thinking  Patient reports she is simply dealing with depression and struggling with worsening symptoms  She reports that she feels the Lexapro is not working  Her PCP prescribes this with no recent changes  She reports compliance  She reports she was previously tried on Celexa and Wellbutrin  Wellbutrin was extremely effective and helped her quit smoking, but she developed an allergy with swelling and had to discontinue it  She has no inpatient history and no substance abuse treatment in the past   She reports that she had outpatient therapy in the past, but stopped it a year ago, feeling she had essentially graduated and reached the maximum benefit at the time  She currently sees only her PCP  Inpatient admission was recommended, and patient agreed  201 completed  Rights and explanation of 72 hour notice provided / reviewed  All questions and concerns of patient and her  (present and supportive) were addressed

## 2022-10-07 NOTE — PLAN OF CARE
RN will meet with patient at least per day to assess for any concerns, teach about prescribed medications and diagnosis  Patient will be taught and encouraged to utilize healthy coping skills

## 2022-10-08 PROBLEM — Z00.8 MEDICAL CLEARANCE FOR PSYCHIATRIC ADMISSION: Status: ACTIVE | Noted: 2022-10-08

## 2022-10-08 PROBLEM — Z78.9 ALCOHOL USE: Status: ACTIVE | Noted: 2022-10-08

## 2022-10-08 PROBLEM — F32.2 SEVERE MAJOR DEPRESSION WITHOUT PSYCHOTIC FEATURES (HCC): Status: ACTIVE | Noted: 2022-10-08

## 2022-10-08 LAB
25(OH)D3 SERPL-MCNC: 30.5 NG/ML (ref 30–100)
ALBUMIN SERPL BCP-MCNC: 3.6 G/DL (ref 3.5–5)
ALP SERPL-CCNC: 46 U/L (ref 46–116)
ALT SERPL W P-5'-P-CCNC: 17 U/L (ref 12–78)
ANION GAP SERPL CALCULATED.3IONS-SCNC: 10 MMOL/L (ref 4–13)
AST SERPL W P-5'-P-CCNC: 31 U/L (ref 5–45)
BASOPHILS # BLD AUTO: 0.02 THOUSANDS/ΜL (ref 0–0.1)
BASOPHILS NFR BLD AUTO: 0 % (ref 0–1)
BILIRUB SERPL-MCNC: 0.4 MG/DL (ref 0.2–1)
BUN SERPL-MCNC: 15 MG/DL (ref 5–25)
CALCIUM SERPL-MCNC: 9.1 MG/DL (ref 8.3–10.1)
CHLORIDE SERPL-SCNC: 105 MMOL/L (ref 96–108)
CHOLEST SERPL-MCNC: 197 MG/DL
CO2 SERPL-SCNC: 26 MMOL/L (ref 21–32)
CREAT SERPL-MCNC: 0.79 MG/DL (ref 0.6–1.3)
EOSINOPHIL # BLD AUTO: 0.11 THOUSAND/ΜL (ref 0–0.61)
EOSINOPHIL NFR BLD AUTO: 2 % (ref 0–6)
ERYTHROCYTE [DISTWIDTH] IN BLOOD BY AUTOMATED COUNT: 14 % (ref 11.6–15.1)
EST. AVERAGE GLUCOSE BLD GHB EST-MCNC: 111 MG/DL
FOLATE SERPL-MCNC: 9.5 NG/ML (ref 3.1–17.5)
GFR SERPL CREATININE-BSD FRML MDRD: 88 ML/MIN/1.73SQ M
GLUCOSE P FAST SERPL-MCNC: 113 MG/DL (ref 65–99)
GLUCOSE SERPL-MCNC: 113 MG/DL (ref 65–140)
HBA1C MFR BLD: 5.5 %
HCG SERPL QL: NEGATIVE
HCT VFR BLD AUTO: 35.8 % (ref 34.8–46.1)
HDLC SERPL-MCNC: 101 MG/DL
HGB BLD-MCNC: 11.6 G/DL (ref 11.5–15.4)
IMM GRANULOCYTES # BLD AUTO: 0.01 THOUSAND/UL (ref 0–0.2)
IMM GRANULOCYTES NFR BLD AUTO: 0 % (ref 0–2)
LDLC SERPL CALC-MCNC: 83 MG/DL (ref 0–100)
LYMPHOCYTES # BLD AUTO: 1.7 THOUSANDS/ΜL (ref 0.6–4.47)
LYMPHOCYTES NFR BLD AUTO: 30 % (ref 14–44)
MAGNESIUM SERPL-MCNC: 1.8 MG/DL (ref 1.6–2.6)
MCH RBC QN AUTO: 27.2 PG (ref 26.8–34.3)
MCHC RBC AUTO-ENTMCNC: 32.4 G/DL (ref 31.4–37.4)
MCV RBC AUTO: 84 FL (ref 82–98)
MONOCYTES # BLD AUTO: 0.46 THOUSAND/ΜL (ref 0.17–1.22)
MONOCYTES NFR BLD AUTO: 8 % (ref 4–12)
NEUTROPHILS # BLD AUTO: 3.44 THOUSANDS/ΜL (ref 1.85–7.62)
NEUTS SEG NFR BLD AUTO: 60 % (ref 43–75)
NONHDLC SERPL-MCNC: 96 MG/DL
NRBC BLD AUTO-RTO: 0 /100 WBCS
PLATELET # BLD AUTO: 249 THOUSANDS/UL (ref 149–390)
PMV BLD AUTO: 9.9 FL (ref 8.9–12.7)
POTASSIUM SERPL-SCNC: 3.9 MMOL/L (ref 3.5–5.3)
PROT SERPL-MCNC: 7.2 G/DL (ref 6.4–8.4)
RBC # BLD AUTO: 4.27 MILLION/UL (ref 3.81–5.12)
SODIUM SERPL-SCNC: 141 MMOL/L (ref 135–147)
TRIGL SERPL-MCNC: 67 MG/DL
TSH SERPL DL<=0.05 MIU/L-ACNC: 1.04 UIU/ML (ref 0.45–4.5)
VIT B12 SERPL-MCNC: 338 PG/ML (ref 100–900)
WBC # BLD AUTO: 5.74 THOUSAND/UL (ref 4.31–10.16)

## 2022-10-08 PROCEDURE — 83735 ASSAY OF MAGNESIUM: CPT | Performed by: PHYSICIAN ASSISTANT

## 2022-10-08 PROCEDURE — 90471 IMMUNIZATION ADMIN: CPT | Performed by: STUDENT IN AN ORGANIZED HEALTH CARE EDUCATION/TRAINING PROGRAM

## 2022-10-08 PROCEDURE — 86592 SYPHILIS TEST NON-TREP QUAL: CPT | Performed by: PHYSICIAN ASSISTANT

## 2022-10-08 PROCEDURE — 82306 VITAMIN D 25 HYDROXY: CPT | Performed by: PHYSICIAN ASSISTANT

## 2022-10-08 PROCEDURE — 85025 COMPLETE CBC W/AUTO DIFF WBC: CPT | Performed by: PHYSICIAN ASSISTANT

## 2022-10-08 PROCEDURE — 80061 LIPID PANEL: CPT | Performed by: PHYSICIAN ASSISTANT

## 2022-10-08 PROCEDURE — 83036 HEMOGLOBIN GLYCOSYLATED A1C: CPT | Performed by: PHYSICIAN ASSISTANT

## 2022-10-08 PROCEDURE — 84443 ASSAY THYROID STIM HORMONE: CPT | Performed by: PHYSICIAN ASSISTANT

## 2022-10-08 PROCEDURE — 82607 VITAMIN B-12: CPT | Performed by: PHYSICIAN ASSISTANT

## 2022-10-08 PROCEDURE — 90686 IIV4 VACC NO PRSV 0.5 ML IM: CPT | Performed by: STUDENT IN AN ORGANIZED HEALTH CARE EDUCATION/TRAINING PROGRAM

## 2022-10-08 PROCEDURE — 84703 CHORIONIC GONADOTROPIN ASSAY: CPT | Performed by: PHYSICIAN ASSISTANT

## 2022-10-08 PROCEDURE — 82746 ASSAY OF FOLIC ACID SERUM: CPT | Performed by: PHYSICIAN ASSISTANT

## 2022-10-08 PROCEDURE — 80053 COMPREHEN METABOLIC PANEL: CPT | Performed by: PHYSICIAN ASSISTANT

## 2022-10-08 PROCEDURE — 99222 1ST HOSP IP/OBS MODERATE 55: CPT

## 2022-10-08 PROCEDURE — 93005 ELECTROCARDIOGRAM TRACING: CPT

## 2022-10-08 PROCEDURE — 99222 1ST HOSP IP/OBS MODERATE 55: CPT | Performed by: PSYCHIATRY & NEUROLOGY

## 2022-10-08 RX ADMIN — ESCITALOPRAM OXALATE 10 MG: 10 TABLET ORAL at 09:27

## 2022-10-08 RX ADMIN — MULTIPLE VITAMINS W/ MINERALS TAB 1 TABLET: TAB ORAL at 09:27

## 2022-10-08 RX ADMIN — FOLIC ACID 1 MG: 1 TABLET ORAL at 09:27

## 2022-10-08 RX ADMIN — Medication 100 MG: at 09:27

## 2022-10-08 RX ADMIN — INFLUENZA VIRUS VACCINE 0.5 ML: 15; 15; 15; 15 SUSPENSION INTRAMUSCULAR at 09:29
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
## 2022-10-08 NOTE — CMS CERTIFICATION NOTE
Recertification: Based upon physical, mental and social evaluations, I certify that inpatient psychiatric services continue to be medically necessary for this patient for a duration of 5 midnights for the treatment of  Severe major depression without psychotic features [facility])   Available alternative community resources still do not meet the patient's mental health care needs  I further attest that an established written individualized plan of care has been updated and is outlined in the patient's medical records

## 2022-10-08 NOTE — H&P
Psychiatric Evaluation - [address] [place] [address] 50 y o  female MRN: [id number]  Unit/Bed#: [facility] 254-02 Encounter: [id number]    Assessment/Plan   Principal Problem:    Severe major depression without psychotic features ([address] )  Active Problems:    Alcohol use    Plan:   1  Patient with good response to lexapro, will increase to 15mg qday vs augmentation with abilify 2mg  2  Discussed medication to help with alcohol use and patient interested in them and also wants referral for outpatient ETOH resources  3   Group and milieu therapy  Current Facility-Administered Medications   Medication Dose Route Frequency Provider Last Rate   • aluminum-magnesium hydroxide-simethicone  30 mL Oral Q4H PRN Oneal Garrett PA-C     • artificial tear  1 application Both Eyes B1Y PRN Oneal Garrett PA-C     • benztropine  1 mg Intramuscular Q4H PRN Max 6/day Oneal Garrett PA-C     • benztropine  1 mg Oral Q4H PRN Max 6/day Oneal Garrett PA-C     • bisacodyl  10 mg Rectal Daily PRN Kristine Ruffin MD     • hydrOXYzine HCL  50 mg Oral Q6H PRN Max 4/day Oneal Garrett PA-C      Or   • diphenhydrAMINE  50 mg Intramuscular Q6H PRN Oneal Garrett PA-C     • escitalopram  10 mg Oral Daily Oneal Garrett PA-C     • folic acid  1 mg Oral Daily Oneal Garrett PA-C     • hydrOXYzine HCL  100 mg Oral Q6H PRN Max 4/day Oneal Garrett PA-C      Or   • LORazepam  2 mg Intramuscular Q6H PRN Oneal Garrett PA-C     • hydrOXYzine HCL  25 mg Oral Q6H PRN Max 4/day Oneal Garrett PA-C     • ibuprofen  400 mg Oral Q4H PRN Oneal Garrett PA-C     • ibuprofen  600 mg Oral Q6H PRN Oneal Garrett PA-C     • ibuprofen  800 mg Oral Q8H PRN Oneal Garrett PA-C     • LORazepam  2 mg Oral Q4H PRN Oneal Garrett PA-C     • multivitamin-minerals  1 tablet Oral Daily Oneal Garrett PA-C     • nicotine  1 patch Transdermal Daily Oneal Garrett PA-C     • nicotine polacrilex  4 mg Oral Q2H PRN Oneal Garrett, LETI     • OLANZapine  5 mg Oral Q4H PRN Max 3/day Radha Blackmon PA-C      Or   • OLANZapine  2 5 mg Intramuscular Q4H PRN Max 3/day Radha Blackmon PA-C     • OLANZapine  5 mg Oral Q3H PRN Max 3/day Radha Blackmon PA-C      Or   • OLANZapine  5 mg Intramuscular Q3H PRN Max 3/day Radha Blackmon PA-C     • OLANZapine  2 5 mg Oral Q4H PRN Max 6/day Radha Blackmon PA-C     • polyethylene glycol  17 g Oral Daily PRN Radha Blackmon PA-C     • senna-docusate sodium  1 tablet Oral Daily PRN Radha Blackmon PA-C     • thiamine  100 mg Oral Daily Radha Blackmon PA-C     • traZODone  50 mg Oral HS PRN Radha Blackmon PA-C       Risks, benefits and possible side effects of Medications:   Risks, benefits, and possible side effects of medications explained to patient and patient verbalizes understanding  increase to lexapro    Chief Complaint: "I don't want to go on living like this"    History of Present Illness     Patient is a 50 y o  female presents with Signs of suicidal potential and Signs/symptoms of alcohol or other substance abuse  Patient was admitted to psychiatric unit on a voluntarily 201 commitment basis  Primary complaints include: feeling depressed  Onset of symptoms was gradual starting a few months ago with gradually worsening course since that time  Psychosocial Stressors: drug and alcohol  Per Seferino Scales "Patient is a 50 yr old female who arrived to the ED via friends / family yesterday seeking support for mental health concerns related to expression of suicidal ideas  On arrival, patient was intoxicated and reportedly was reluctant to exit the vehicle and sign in, but with encouragement, she did so  Alcohol level was elevated, thereby delaying the crisis evaluation  Patient is now clinically sober, alert, oriented, pleasant, and cooperative  Patient does endorse suicidal ideas, however, she denied plan when questioned    She stated that she has been dealing with depression for many years ("all my life, really") and it is just progressively getting worse with no identifiable precipitant "  Psychiatric Review Of Systems:  sleep: yes  appetite changes: yes  energy/anergy: yes  interest/pleasure/anhedonia: yes  somatic symptoms: no  anxiety/panic: no  wang: no  guilty/hopeless: yes  self injurious behavior/risky behavior: yes, alcohol use    Historical Information     Past Psychiatric History:   Prior treatment as outpatient with lexapro, celexa, wellbutrin  Prior outpatient therapy    Substance Abuse History:  E-Cigarette/Vaping   • E-Cigarette Use Never User       E-Cigarette/Vaping Substances   • Nicotine No    • THC No    • CBD No    • Flavoring No    • Other No    • Unknown No        Social History     Tobacco History     Smoking Status  Current Every Day Smoker Smoking Frequency  0 5 packs/day for 25 years (12 5 pk yrs) Smoking Tobacco Type  Cigarettes    Smokeless Tobacco Use  Never Used          Alcohol History     Alcohol Use Status  Yes Drinks/Week  8 Shots of liquor per week Amount  8 0 standard drinks of alcohol/wk Comment  4-6 drinks/week          Drug Use     Drug Use Status  Never          Sexual Activity     Sexually Active  Not Asked          Activities of Daily Living    Not Asked               Additional Substance Use Detail     Questions Responses    Problems Due to Past Use of Alcohol? Yes    Problems Due to Past Use of Substances?  No    Substance Use Assessment Substance use within the past 12 months    Alcohol Use Frequency 1 or 2 times/week    Cannabis frequency Never used    Comment:  Never used on 10/7/2022     Heroin Frequency Denies use in past 12 months    Alcohol Drink of Choice rum    1st Use of Alcohol 10/6/22    Cocaine frequency Never used    Comment:  Never used on 10/7/2022     Crack Cocaine Frequency Denies use in past 12 months    Methamphetamine Frequency Denies use in past 12 months    Narcotic Frequency Denies use in past 12 months    Benzodiazepine Frequency Denies use in past 12 months    Amphetamine frequency Denies use in past 12 months    Barbituate Frequency Denies use use in past 12 months    Inhalant frequency Never used    Comment:  Never used on 10/7/2022     Hallucinogen frequency Never used    Comment:  Never used on 10/7/2022     Ecstasy frequency Never used    Comment:  Never used on 10/7/2022     Other drug frequency Never used    Comment:  Never used on 10/7/2022     Opiate frequency Denies use in past 12 months    Last reviewed by Santiago Bravo RN on 10/7/2022        I have assessed this patient for substance use within the past 12 months    Social History:  , has children      Past Medical History:   Diagnosis Date   • Depression    • Shingles        Medical Review Of Systems:  Pertinent items are noted in HPI    Denies any tremors or withdrawal symptoms  Meds/Allergies   all current active meds have been reviewed and current meds:   Current Facility-Administered Medications   Medication Dose Route Frequency   • aluminum-magnesium hydroxide-simethicone (MYLANTA) oral suspension 30 mL  30 mL Oral Q4H PRN   • artificial tear (LUBRIFRESH P M ) ophthalmic ointment 1 application  1 application Both Eyes K1L PRN   • benztropine (COGENTIN) injection 1 mg  1 mg Intramuscular Q4H PRN Max 6/day   • benztropine (COGENTIN) tablet 1 mg  1 mg Oral Q4H PRN Max 6/day   • bisacodyl (DULCOLAX) rectal suppository 10 mg  10 mg Rectal Daily PRN   • hydrOXYzine HCL (ATARAX) tablet 50 mg  50 mg Oral Q6H PRN Max 4/day    Or   • diphenhydrAMINE (BENADRYL) injection 50 mg  50 mg Intramuscular Q6H PRN   • [START ON 10/9/2022] escitalopram (LEXAPRO) tablet 15 mg  15 mg Oral Daily   • folic acid (FOLVITE) tablet 1 mg  1 mg Oral Daily   • hydrOXYzine HCL (ATARAX) tablet 100 mg  100 mg Oral Q6H PRN Max 4/day    Or   • LORazepam (ATIVAN) injection 2 mg  2 mg Intramuscular Q6H PRN   • hydrOXYzine HCL (ATARAX) tablet 25 mg  25 mg Oral Q6H PRN Max 4/day   • ibuprofen (MOTRIN) tablet 400 mg  400 mg Oral Q4H PRN   • ibuprofen (MOTRIN) tablet 600 mg  600 mg Oral Q6H PRN   • ibuprofen (MOTRIN) tablet 800 mg  800 mg Oral Q8H PRN   • LORazepam (ATIVAN) tablet 2 mg  2 mg Oral Q4H PRN   • multivitamin-minerals (CENTRUM) tablet 1 tablet  1 tablet Oral Daily   • nicotine (NICODERM CQ) 14 mg/24hr TD 24 hr patch 1 patch  1 patch Transdermal Daily   • nicotine polacrilex (NICORETTE) gum 4 mg  4 mg Oral Q2H PRN   • OLANZapine (ZyPREXA) tablet 5 mg  5 mg Oral Q4H PRN Max 3/day    Or   • OLANZapine (ZyPREXA) IM injection 2 5 mg  2 5 mg Intramuscular Q4H PRN Max 3/day   • OLANZapine (ZyPREXA) tablet 5 mg  5 mg Oral Q3H PRN Max 3/day    Or   • OLANZapine (ZyPREXA) IM injection 5 mg  5 mg Intramuscular Q3H PRN Max 3/day   • OLANZapine (ZyPREXA) tablet 2 5 mg  2 5 mg Oral Q4H PRN Max 6/day   • polyethylene glycol (MIRALAX) packet 17 g  17 g Oral Daily PRN   • senna-docusate sodium (SENOKOT S) 8 6-50 mg per tablet 1 tablet  1 tablet Oral Daily PRN   • thiamine tablet 100 mg  100 mg Oral Daily   • traZODone (DESYREL) tablet 50 mg  50 mg Oral HS PRN     Allergies   Allergen Reactions   • Bupropion Facial Swelling       Objective   Vital signs in last 24 hours:  Temp:  [98 2 °F (36 8 °C)-99 5 °F (37 5 °C)] 99 5 °F (37 5 °C)  HR:  [] 61  Resp:  [16-18] 16  BP: (103-131)/(75-85) 103/75    No intake or output data in the 24 hours ending 10/08/22 1137    Mental Status Evaluation:  Appearance:  age appropriate   Behavior:  normal   Speech:  normal pitch and normal volume   Mood:  depressed   Affect:  mood-incongruent and superficially bright   Language: naming objects   Thought Process:  normal   Associations: intact associations   Thought Content:  normal   Perceptual Disturbances: None   Risk Potential: Suicidal Ideations without plan     Sensorium:  person, place and situation   Memory:  recent and remote memory grossly intact   Consciousness:  alert and awake    Attention: attention span and concentration were age appropriate   Intellect: within normal limits   Fund of Knowledge: awareness of current events: fair   Insight:  fair   Judgment: limited   Muscle Strength:  Muscle Tone: normal up OOB on own  normal   Gait/Station: normal gait/station   Motor Activity: no abnormal movements     Lab Results: I have personally reviewed all pertinent laboratory/tests results     Most Recent Labs:   Lab Results   Component Value Date    WBC 5 74 10/08/2022    RBC 4 27 10/08/2022    HGB 11 6 10/08/2022    HCT 35 8 10/08/2022     10/08/2022    RDW 14 0 10/08/2022    NEUTROABS 3 44 10/08/2022    SODIUM 141 10/08/2022    K 3 9 10/08/2022     10/08/2022    CO2 26 10/08/2022    BUN 15 10/08/2022    CREATININE 0 79 10/08/2022    GLUC 113 10/08/2022    GLUF 113 (H) 10/08/2022    CALCIUM 9 1 10/08/2022    AST 31 10/08/2022    ALT 17 10/08/2022    ALKPHOS 46 10/08/2022    TP 7 2 10/08/2022    ALB 3 6 10/08/2022    TBILI 0 40 10/08/2022    CHOLESTEROL 197 10/08/2022     10/08/2022    TRIG 67 10/08/2022    LDLCALC 83 10/08/2022    NONHDLC 96 10/08/2022    MVI1PYPXNXCH 1 044 10/08/2022    PREGSERUM Negative 10/08/2022    HGBA1C 5 5 09/14/2020     09/14/2020     EKG   Lab Results   Component Value Date    VENTRATE 107 10/06/2022    ATRIALRATE 107 10/06/2022    PRINT 130 10/06/2022    QRSDINT 82 10/06/2022    QTINT 346 10/06/2022    QTCINT 461 10/06/2022    PAXIS 61 10/06/2022    QRSAXIS 71 10/06/2022    TWAVEAXIS 0 10/06/2022      Wt Readings from Last 3 Encounters:   10/07/22 62 6 kg (138 lb)   11/12/18 58 1 kg (128 lb)     Temp Readings from Last 3 Encounters:   10/08/22 99 5 °F (37 5 °C) (Tympanic)   10/06/22 98 °F (36 7 °C) (Oral)   11/12/18 98 7 °F (37 1 °C) (Temporal)     BP Readings from Last 3 Encounters:   10/08/22 103/75   10/07/22 119/63   11/12/18 90/70     Pulse Readings from Last 3 Encounters:   10/08/22 61   10/07/22 93   11/12/18 77         Code Status: Level 1 - Full Code  Advance Directive and Living Will:      Power of :    POLST:      Patient Strengths/Assets: patient is on a voluntary commitment    Patient Barriers/Limitations: substance abuse    Counseling / Coordination of Care  Total floor / unit time spent today 45 minutes  Greater than 50% of total time was spent with the patient and / or family counseling and / or coordination of care   A description of the counseling / coordination of care: medications, substance use counseling

## 2022-10-08 NOTE — CONSULTS
[facility] [birth year], 50 y o  female MRN: [id number]  Unit/Bed#: [facility] [place] 254-02 Encounter: [id number]  Primary Care Provider: No primary care provider on file  Date and time admitted to hospital: 10/7/2022  1:31 PM    Inpatient consult for Medical Clearance for [facility] patient  Consult performed by: Mireya Caballero PA-C  Consult ordered by: Vivi Madrid PA-C          Medical clearance for psychiatric admission  Assessment & Plan  Admission labs reviewed, CBC, CMP, lipid panel, UA acceptable  Vitals stable   UDS negative  COVID-19 negative  EKG pending  Medically stable for continued inpatient psychiatric treatment based on available results  Please contact [facility] with any questions or concerns    Alcohol use  Assessment & Plan  · Patient reports drinking twice weekly with 4-6 drinks at a time (rum/coke)  · Last drink on 10/06 prior to arrival to ED, EtOH in ED was 306  · No history of alcohol withdrawal or seizures  · No withdrawal symptoms at this time  · Recommend CIWA and vitamins    * Severe major depression without psychotic features [facility])  Assessment & Plan  · Treatment per Psychiatry        Counseling / Coordination of Care Time: 30 minutes  Greater than 50% of total time spent on patient counseling and coordination of care  Collaboration of Care: Were Recommendations Directly Discussed with Primary Treatment Team? - No     History of Present Illness:    Barb Shaw is a 50 y o  female with PMH of tobacco use, alcohol use, depression who is originally admitted to the psychiatry service due to expression of suicidal ideation  We are consulted for medical clearance for admission to [facility] Unit and treatment of underlying psychiatric illness  Discussed the patient's medical history with her as above, she denies taking any daily nonpsychiatric medications    The patient states she smokes half pack of cigarettes daily, refuses nicotine patch at this time  The patient also endorses heavy alcohol use, states she drinks twice weekly on average with 4-6 drinks of rum/coke at a time  Patient states/drink was on 10/06 prior to arrival to ED at which time patient was intoxicated with medical alcohol level >300  Currently without withdrawal symptoms, no history of alcohol withdrawal   Patient otherwise endorses mild headache but denies fevers chills, chest pain, diaphoresis, tremors, shortness of breath, cough/wheeze, abdominal symptoms, urinary symptoms, rashes or wounds  Based on available data, the patient appears medically stable to continue with inpatient psychiatric treatment  Review of Systems:    Review of Systems   Constitutional: Negative for appetite change, chills, fatigue and fever  HENT: Negative for congestion, rhinorrhea and sore throat  Eyes: Negative for visual disturbance  Respiratory: Negative for cough, chest tightness, shortness of breath and wheezing  Cardiovascular: Negative for chest pain and palpitations  Gastrointestinal: Negative for abdominal pain, constipation, diarrhea, nausea and vomiting  Genitourinary: Negative for difficulty urinating, dysuria, frequency and urgency  Musculoskeletal: Negative for arthralgias, back pain and myalgias  Skin: Negative for rash and wound  Neurological: Positive for headaches  Negative for dizziness and light-headedness  All other systems reviewed and are negative  Past Medical and Surgical History:     Past Medical History:   Diagnosis Date   • Depression    • Shingles        Past Surgical History:   Procedure Laterality Date   •  SECTION     • DILATION AND CURETTAGE OF UTERUS         Meds/Allergies:    PTA meds:   Prior to Admission Medications   Prescriptions Last Dose Informant Patient Reported?  Taking?   escitalopram (LEXAPRO) 10 mg tablet  Self Yes Yes   Sig: TAKE 1 TABLET BY MOUTH EVERY DAY   escitalopram (LEXAPRO) 10 mg tablet  Self Yes No Sig: Take by mouth   Patient not taking: Reported on 10/7/2022      Facility-Administered Medications: None       Allergies: Allergies   Allergen Reactions   • Bupropion Facial Swelling       Social History:     Marital Status: /Civil Union    Substance Use History:   Social History     Substance and Sexual Activity   Alcohol Use Yes   • Alcohol/week: 8 0 standard drinks   • Types: 8 Shots of liquor per week    Comment: 4-6 drinks/week     Social History     Tobacco Use   Smoking Status Current Every Day Smoker   • Packs/day: 0 50   • Years: 25 00   • Pack years: 12 50   • Types: Cigarettes   Smokeless Tobacco Never Used     Social History     Substance and Sexual Activity   Drug Use Never       Family History:    Family History   Problem Relation Age of Onset   • Osteoporosis Mother    • Hyperlipidemia Mother    • Heart disease Father    • Hypertension Father        Physical Exam:     Vitals:   Blood Pressure: 103/75 (10/08/22 0740)  Pulse: 61 (10/08/22 0740)  Temperature: 99 5 °F (37 5 °C) (10/08/22 0740)  Temp Source: Tympanic (10/08/22 0740)  Respirations: 16 (10/08/22 0740)  Height: 5' (152 4 cm) (10/07/22 1333)  Weight - Scale: 62 6 kg (138 lb) (Waist: 27") (10/07/22 1333)  SpO2: 98 % (10/07/22 1333)    Physical Exam  Vitals and nursing note reviewed  Constitutional:       General: She is not in acute distress  Appearance: She is not ill-appearing  HENT:      Head: Normocephalic and atraumatic  Nose: Nose normal    Eyes:      General:         Right eye: No discharge  Left eye: No discharge  Extraocular Movements: Extraocular movements intact  Conjunctiva/sclera: Conjunctivae normal    Cardiovascular:      Rate and Rhythm: Normal rate and regular rhythm  Heart sounds: Normal heart sounds  No murmur heard  Pulmonary:      Effort: Pulmonary effort is normal  No respiratory distress  Breath sounds: Normal breath sounds  No wheezing, rhonchi or rales  Abdominal:      General: Bowel sounds are normal       Palpations: Abdomen is soft  Tenderness: There is no abdominal tenderness  There is no guarding  Musculoskeletal:      Right lower leg: No edema  Left lower leg: No edema  Skin:     General: Skin is warm and dry  Neurological:      Mental Status: She is alert and oriented to person, place, and time  Psychiatric:         Mood and Affect: Mood normal          Behavior: Behavior normal          Additional Data:     Lab Results: I have personally reviewed pertinent reports  Results from last 7 days   Lab Units 10/08/22  0615   WBC Thousand/uL 5 74   HEMOGLOBIN g/dL 11 6   HEMATOCRIT % 35 8   PLATELETS Thousands/uL 249   NEUTROS PCT % 60   LYMPHS PCT % 30   MONOS PCT % 8   EOS PCT % 2     Results from last 7 days   Lab Units 10/08/22  0615   SODIUM mmol/L 141   POTASSIUM mmol/L 3 9   CHLORIDE mmol/L 105   CO2 mmol/L 26   BUN mg/dL 15   CREATININE mg/dL 0 79   ANION GAP mmol/L 10   CALCIUM mg/dL 9 1   ALBUMIN g/dL 3 6   TOTAL BILIRUBIN mg/dL 0 40   ALK PHOS U/L 46   ALT U/L 17   AST U/L 31   GLUCOSE RANDOM mg/dL 113             Lab Results   Component Value Date/Time    HGBA1C 5 5 09/14/2020 08:13 AM    HGBA1C 5 6 05/30/2019 08:20 AM           EKG, Pathology, and Other Studies Reviewed on Admission:   · EKG not obtained    ** Please Note: This note has been constructed using a voice recognition system   **

## 2022-10-08 NOTE — NURSING NOTE
Pt resting in room during conversation  Visible and social with peers/roommate throughout the evening  Denies any W/D sx  Reports adjusting well to the unit and is looking forward to getting a good nights rest  Had multiple supportive phone calls with   Denies SI/HI/AVH

## 2022-10-08 NOTE — ASSESSMENT & PLAN NOTE
Admission labs reviewed, CBC, CMP, lipid panel, UA acceptable  Vitals stable   UDS negative  COVID-19 negative  EKG pending  Medically stable for continued inpatient psychiatric treatment based on available results  Please contact [facility] with any questions or concerns

## 2022-10-08 NOTE — TREATMENT PLAN
TREATMENT PLAN REVIEW - Kira 50 y o  [birth year] female MRN: [id number]    [address] [place] [address] Room / Bed: Annette Ray [facility] [id number] Encounter: [id number]          Admit Date/Time:  10/7/2022  1:31 PM    Treatment Team: Attending Provider: Brittany Brooks MD; Security: Joanna Triplett; Registered Nurse: Tabitha Goff, RN; Registered Nurse: Erica Moncada RN (Inactive); Security: Avtar Israel; Patient Care Technician: Franco Coombs; Patient Care Assistant: Claudia Foster;  Charge Nurse: Adalberto Go, THOMAS; Registered Nurse: Billy Spicer, RN; Medications RN: Ayaka Montaño RN    Diagnosis: Principal Problem:    Severe major depression without psychotic features [facility])  Active Problems:    Alcohol use      Patient Strengths/Assets: patient is on a voluntary commitment    Patient Barriers/Limitations: substance abuse    Short Term Goals: decrease in depressive symptoms, acceptance of need for psychiatric treatment    Long Term Goals: improvement in depression, free of suicidal thoughts, acceptance of need for psychiatric follow up after discharge    Progress Towards Goals: starting psychiatric medications as prescribed, starting alcohol detoxification protocol    Recommended Treatment: medication management, patient medication education, group therapy, milieu therapy, continued Behavioral Health psychiatric evaluation/assessment process    Treatment Frequency: daily medication monitoring, group and milieu therapy daily, monitoring through interdisciplinary rounds, monitoring through weekly patient care conferences    Expected Discharge Date:  5 days    Discharge Plan: referrals as indicated    Treatment Plan Created/Updated By: Anjel Bell MD

## 2022-10-08 NOTE — ASSESSMENT & PLAN NOTE
· Patient reports drinking twice weekly with 4-6 drinks at a time (rum/coke)  · Last drink on 10/06 prior to arrival to ED, EtOH in ED was 306  · No history of alcohol withdrawal or seizures  · No withdrawal symptoms at this time  · Recommend CIWA and vitamins

## 2022-10-08 NOTE — NURSING NOTE
Pt remains pleasant and calm  Denies SI/HI, social with peers and attends groups  Pt states feeling slightly bored on the unit however happy to be here receiving treatment  Discussed scheduled medications with pt  Pt denies any concerns at this time  Reports sleeping fairly well

## 2022-10-08 NOTE — PROGRESS NOTES
Progress Note - [facility] Alfa 50 y o  female MRN: [id number]  Unit/Bed#: WOL[place]ANG [place] 254-02 Encounter: [id number]    The patient was seen for continuing care and reviewed with treatment team     Current Mental Status Evaluation:  Appearance:  Adequate hygiene and grooming   Behavior:  cooperative and friendly   Mood:  "bored"   Affect: mood-congruent   Speech: Normal volume and Normal rate   Thought Process:  Goal directed and coherent   Thought Content:  Does not verbalize delusional material   Perceptual Disturbances: Denies hallucinations and does not appear to be responding to internal stimuli   Risk Potential: No suicidal or homicidal ideation   Orientation:   Person, place, situation     Per RN report she was 0 for CIWA, no SI and bored  Insightful with her drinking and given patient education on naltrexone  Spoke to family on phone and tolerated lexapro increase    Admission on 10/06/2022, Discharged on 10/07/2022   Component Date Value Ref Range Status   • WBC 10/06/2022 6 03  4 31 - 10 16 Thousand/uL Final   • RBC 10/06/2022 4 75  3 81 - 5 12 Million/uL Final   • Hemoglobin 10/06/2022 12 9  11 5 - 15 4 g/dL Final   • Hematocrit 10/06/2022 40 3  34 8 - 46 1 % Final   • MCV 10/06/2022 85  82 - 98 fL Final   • MCH 10/06/2022 27 2  26 8 - 34 3 pg Final   • MCHC 10/06/2022 32 0  31 4 - 37 4 g/dL Final   • RDW 10/06/2022 13 9  11 6 - 15 1 % Final   • MPV 10/06/2022 9 2  8 9 - 12 7 fL Final   • Platelets 27/86/6018 272  149 - 390 Thousands/uL Final   • nRBC 10/06/2022 0  /100 WBCs Final   • Neutrophils Relative 10/06/2022 67  43 - 75 % Final   • Immat GRANS % 10/06/2022 0  0 - 2 % Final   • Lymphocytes Relative 10/06/2022 28  14 - 44 % Final   • Monocytes Relative 10/06/2022 4  4 - 12 % Final   • Eosinophils Relative 10/06/2022 1  0 - 6 % Final   • Basophils Relative 10/06/2022 0  0 - 1 % Final   • Neutrophils Absolute 10/06/2022 3 99  1 85 - 7 62 Thousands/µL Final   • Immature Grans Absolute 10/06/2022 0 01  0 00 - 0 20 Thousand/uL Final   • Lymphocytes Absolute 10/06/2022 1 70  0 60 - 4 47 Thousands/µL Final   • Monocytes Absolute 10/06/2022 0 26  0 17 - 1 22 Thousand/µL Final   • Eosinophils Absolute 10/06/2022 0 05  0 00 - 0 61 Thousand/µL Final   • Basophils Absolute 10/06/2022 0 02  0 00 - 0 10 Thousands/µL Final   • Sodium 10/06/2022 143  135 - 147 mmol/L Final   • Potassium 10/06/2022 3 7  3 5 - 5 3 mmol/L Final   • Chloride 10/06/2022 108  96 - 108 mmol/L Final   • CO2 10/06/2022 21  21 - 32 mmol/L Final   • ANION GAP 10/06/2022 14 (A) 4 - 13 mmol/L Final   • BUN 10/06/2022 11  5 - 25 mg/dL Final   • Creatinine 10/06/2022 0 75  0 60 - 1 30 mg/dL Final    Standardized to IDMS reference method   • Glucose 10/06/2022 78  65 - 140 mg/dL Final    If the patient is fasting, the ADA then defines impaired fasting glucose as > 100 mg/dL and diabetes as > or equal to 123 mg/dL  Specimen collection should occur prior to Sulfasalazine administration due to the potential for falsely depressed results  Specimen collection should occur prior to Sulfapyridine administration due to the potential for falsely elevated results  • Calcium 10/06/2022 9 2  8 4 - 10 2 mg/dL Final   • AST 10/06/2022 20  13 - 39 U/L Final    Specimen collection should occur prior to Sulfasalazine administration due to the potential for falsely depressed results  • ALT 10/06/2022 6 (A) 7 - 52 U/L Final    Specimen collection should occur prior to Sulfasalazine administration due to the potential for falsely depressed results      • Alkaline Phosphatase 10/06/2022 49  34 - 104 U/L Final   • Total Protein 10/06/2022 7 9  6 4 - 8 4 g/dL Final   • Albumin 10/06/2022 4 9  3 5 - 5 0 g/dL Final   • Total Bilirubin 10/06/2022 0 25  0 20 - 1 00 mg/dL Final   • eGFR 10/06/2022 93  ml/min/1 73sq m Final   • TSH 3RD GENERATON 10/06/2022 0 534  0 450 - 4 500 uIU/mL Final    The recommended reference ranges for TSH during pregnancy are as follows: First trimester 0 1 to 2 5 uIU/mL   Second trimester  0 2 to 3 0 uIU/mL   Third trimester 0 3 to 3 0 uIU/m    Note: Normal ranges may not apply to patients who are transgender, non-binary, or whose legal sex, sex at birth, and gender identity differ  Adult TSH (3rd generation) reference range follows the recommended guidelines of the American Thyroid Association, January, 2020     • Amph/Meth UR 10/06/2022 Negative  Negative Final   • Barbiturate Ur 10/06/2022 Negative  Negative Final   • Benzodiazepine Urine 10/06/2022 Negative  Negative Final   • Cocaine Urine 10/06/2022 Negative  Negative Final   • Methadone Urine 10/06/2022 Negative  Negative Final   • Opiate Urine 10/06/2022 Negative  Negative Final   • PCP Ur 10/06/2022 Negative  Negative Final   • THC Urine 10/06/2022 Negative  Negative Final   • Oxycodone Urine 10/06/2022 Negative  Negative Final   • Color, UA 10/06/2022 Colorless   Final   • Clarity, UA 10/06/2022 Clear   Final   • Specific Gravity, UA 10/06/2022 1 003  1 003 - 1 030 Final   • pH, UA 10/06/2022 5 0  4 5, 5 0, 5 5, 6 0, 6 5, 7 0, 7 5, 8 0 Final   • Leukocytes, UA 10/06/2022 Negative  Negative Final   • Nitrite, UA 10/06/2022 Negative  Negative Final   • Protein, UA 10/06/2022 Negative  Negative mg/dl Final   • Glucose, UA 10/06/2022 Negative  Negative mg/dl Final   • Ketones, UA 10/06/2022 Negative  Negative mg/dl Final   • Urobilinogen, UA 10/06/2022 <2 0  <2 0 mg/dl mg/dl Final   • Bilirubin, UA 10/06/2022 Negative  Negative Final   • Occult Blood, UA 10/06/2022 Negative  Negative Final   • SARS-CoV-2 10/06/2022 Negative  Negative Final        • INFLUENZA A PCR 10/06/2022 Negative  Negative Final        • INFLUENZA B PCR 10/06/2022 Negative  Negative Final        • RSV PCR 10/06/2022 Negative  Negative Final        • Ethanol Lvl 10/06/2022 316 (A) <10 mg/dL Final   • Salicylate Lvl 86/20/1218 <5  3 - 20 mg/dL Final   • Acetaminophen Level 10/06/2022 <10 (A) 10 - 20 ug/mL Final   • EXTBreath Alcohol 10/06/2022 0 238   Final   • EXT PREG TEST UR (Ref: Negative) 10/06/2022 negative   Final   • Control 10/06/2022 valid   Final   • EXTBreath Alcohol 10/06/2022 0 208   Final   • EXTBreath Alcohol 10/06/2022 0 168   Final   • EXTBreath Alcohol 10/06/2022 0 128   Final   • Ventricular Rate 10/06/2022 107  BPM Final   • Atrial Rate 10/06/2022 107  BPM Final   • AR Interval 10/06/2022 130  ms Final   • QRSD Interval 10/06/2022 82  ms Final   • QT Interval 10/06/2022 346  ms Final   • QTC Interval 10/06/2022 461  ms Final   • P Axis 10/06/2022 61  degrees Final   • QRS Axis 10/06/2022 71  degrees Final   • T Wave Axis 10/06/2022 0  degrees Final     Lab Results   Component Value Date    WBC 5 74 10/08/2022    HGB 11 6 10/08/2022    HCT 35 8 10/08/2022    MCV 84 10/08/2022     10/08/2022     Lab Results   Component Value Date    SODIUM 141 10/08/2022    K 3 9 10/08/2022     10/08/2022    CO2 26 10/08/2022    AGAP 10 10/08/2022    BUN 15 10/08/2022    CREATININE 0 79 10/08/2022    GLUC 113 10/08/2022    GLUF 113 (H) 10/08/2022    CALCIUM 9 1 10/08/2022    AST 31 10/08/2022    ALT 17 10/08/2022    ALKPHOS 46 10/08/2022    TP 7 2 10/08/2022    TBILI 0 40 10/08/2022    EGFR 88 10/08/2022         Progress Toward Goals: No significant events in the past 24 hours  Principal Problem:    Severe major depression without psychotic features ([facility] [address] )  Active Problems:    Alcohol use    Medical clearance for psychiatric admission    Discharge planning update: The patient will return to previous living arrangement    Recommended Treatment: Continue with pharmacotherapy, group therapy, milieu therapy and occupational therapy    The patient will be maintained on the following medications:  Current Facility-Administered Medications   Medication Dose Route Frequency Provider Last Rate   • aluminum-magnesium hydroxide-simethicone  30 mL Oral Q4H PRN Crystal Moya PA-C     • artificial tear  1 application Both Eyes Q3H PRN Lory Andrade PA-C     • benztropine  1 mg Intramuscular Q4H PRN Max 6/day Lory Andrade PA-C     • benztropine  1 mg Oral Q4H PRN Max 6/day Lory Andrade PA-C     • bisacodyl  10 mg Rectal Daily PRN Jagruti Villarreal MD     • hydrOXYzine HCL  50 mg Oral Q6H PRN Max 4/day Lory Andrade PA-C      Or   • diphenhydrAMINE  50 mg Intramuscular Q6H PRN Lory Andrade PA-C     • [START ON 10/9/2022] escitalopram  15 mg Oral Daily Bobo Dempsey MD     • folic acid  1 mg Oral Daily Lory Andrade PA-C     • hydrOXYzine HCL  100 mg Oral Q6H PRN Max 4/day Lory nAdrade PA-C      Or   • LORazepam  2 mg Intramuscular Q6H PRN Lory Andrade PA-C     • hydrOXYzine HCL  25 mg Oral Q6H PRN Max 4/day Lory Andrade PA-C     • ibuprofen  400 mg Oral Q4H PRN Lory Andrade PA-C     • ibuprofen  600 mg Oral Q6H PRN Lory Andrade PA-C     • ibuprofen  800 mg Oral Q8H PRN Lory Andrade PA-C     • LORazepam  2 mg Oral Q4H PRN Lory Andrade PA-C     • multivitamin-minerals  1 tablet Oral Daily Lory Andrade PA-C     • nicotine  1 patch Transdermal Daily Lory Andrade PA-C     • nicotine polacrilex  4 mg Oral Q2H PRN Lory Andrade PA-C     • OLANZapine  5 mg Oral Q4H PRN Max 3/day Lory Andrade PA-C      Or   • OLANZapine  2 5 mg Intramuscular Q4H PRN Max 3/day Lory Andrade PA-C     • OLANZapine  5 mg Oral Q3H PRN Max 3/day Lory Andrade PA-C      Or   • OLANZapine  5 mg Intramuscular Q3H PRN Max 3/day Lory Andrade PA-C     • OLANZapine  2 5 mg Oral Q4H PRN Max 6/day Lory Andrade PA-C     • polyethylene glycol  17 g Oral Daily PRN Lory Andrade PA-C     • senna-docusate sodium  1 tablet Oral Daily PRN Lory Andrade PA-C     • thiamine  100 mg Oral Daily Lory Andrade PA-C     • traZODone  50 mg Oral HS PRN Lory Andrade PA-C

## 2022-10-08 NOTE — ED NOTES
This writer attempted to contact [employer] [facility] for a pre-cert for this patient  The message requested to leave patient information in a message or to call back  Hours of availability were not shared and a messaged was not left at this time  The contact at [facility] is Maria L at [phone] ext, [phone]

## 2022-10-08 NOTE — PLAN OF CARE
Problem: SELF HARM/SUICIDALITY  Goal: Will have no self-injury during hospital stay  Description: INTERVENTIONS:  - Q 15 MINUTES: Routine safety checks  - Q WAKING SHIFT & PRN: Assess risk to determine if routine checks are adequate to maintain patient safety  - Encourage patient to participate actively in care by formulating a plan to combat response to suicidal ideation, identify supports and resources  Outcome: Progressing     Problem: DEPRESSION  Goal: Will be euthymic at discharge  Description: INTERVENTIONS:  - Administer medication as ordered  - Provide emotional support via 1:1 interaction with staff  - Encourage involvement in milieu/groups/activities  - Monitor for social isolation  Outcome: Progressing     Problem: ANXIETY  Goal: Will report anxiety at manageable levels  Description: INTERVENTIONS:  - Administer medication as ordered  - Teach and encourage coping skills  - Provide emotional support  - Assess patient/family for anxiety and ability to cope  Outcome: Progressing  Goal: By discharge: Patient will verbalize 2 strategies to deal with anxiety  Description: Interventions:  - Identify any obvious source/trigger to anxiety  - Staff will assist patient in applying identified coping technique/skills  - Encourage attendance of scheduled groups and activities  Outcome: Progressing     Problem: SLEEP DISTURBANCE  Goal: Will exhibit normal sleeping pattern  Description: Interventions:  -  Assess the patients sleep pattern, noting recent changes  - Administer medication as ordered  - Decrease environmental stimuli, including noise, as appropriate during the night  - Encourage the patient to actively participate in unit groups and or exercise during the day to enhance ability to achieve adequate sleep at night  - Assess the patient, in the morning, encouraging a description of sleep experience  Outcome: Progressing     Problem: Ineffective Coping  Goal: Participates in unit activities  Description: Interventions:  - Provide therapeutic environment   - Provide required programming   - Redirect inappropriate behaviors   Outcome: Progressing

## 2022-10-09 LAB
ATRIAL RATE: 71 BPM
P AXIS: 74 DEGREES
PR INTERVAL: 122 MS
QRS AXIS: 72 DEGREES
QRSD INTERVAL: 86 MS
QT INTERVAL: 404 MS
QTC INTERVAL: 439 MS
T WAVE AXIS: 39 DEGREES
VENTRICULAR RATE: 71 BPM

## 2022-10-09 PROCEDURE — 99232 SBSQ HOSP IP/OBS MODERATE 35: CPT | Performed by: PSYCHIATRY & NEUROLOGY

## 2022-10-09 PROCEDURE — 93010 ELECTROCARDIOGRAM REPORT: CPT | Performed by: INTERNAL MEDICINE

## 2022-10-09 RX ADMIN — Medication 100 MG: at 09:32

## 2022-10-09 RX ADMIN — FOLIC ACID 1 MG: 1 TABLET ORAL at 09:32

## 2022-10-09 RX ADMIN — ESCITALOPRAM 15 MG: 5 TABLET, FILM COATED ORAL at 09:32

## 2022-10-09 RX ADMIN — MULTIPLE VITAMINS W/ MINERALS TAB 1 TABLET: TAB ORAL at 09:32

## 2022-10-09 NOTE — PLAN OF CARE
Problem: SELF HARM/SUICIDALITY  Goal: Will have no self-injury during hospital stay  Description: INTERVENTIONS:  - Q 15 MINUTES: Routine safety checks  - Q WAKING SHIFT & PRN: Assess risk to determine if routine checks are adequate to maintain patient safety  - Encourage patient to participate actively in care by formulating a plan to combat response to suicidal ideation, identify supports and resources  Outcome: Progressing     Problem: ANXIETY  Goal: Will report anxiety at manageable levels  Description: INTERVENTIONS:  - Administer medication as ordered  - Teach and encourage coping skills  - Provide emotional support  - Assess patient/family for anxiety and ability to cope  Outcome: Progressing  Goal: By discharge: Patient will verbalize 2 strategies to deal with anxiety  Description: Interventions:  - Identify any obvious source/trigger to anxiety  - Staff will assist patient in applying identified coping technique/skills  - Encourage attendance of scheduled groups and activities  Outcome: Progressing     Problem: SLEEP DISTURBANCE  Goal: Will exhibit normal sleeping pattern  Description: Interventions:  -  Assess the patients sleep pattern, noting recent changes  - Administer medication as ordered  - Decrease environmental stimuli, including noise, as appropriate during the night  - Encourage the patient to actively participate in unit groups and or exercise during the day to enhance ability to achieve adequate sleep at night  - Assess the patient, in the morning, encouraging a description of sleep experience  Outcome: Progressing     Problem: Ineffective Coping  Goal: Participates in unit activities  Description: Interventions:  - Provide therapeutic environment   - Provide required programming   - Redirect inappropriate behaviors   Outcome: Progressing

## 2022-10-09 NOTE — DISCHARGE INSTR - OTHER ORDERS
[address] [facility] INFORMATION   The PEER LINE is a toll-free telephone number for people in [facility] who are seeking a listening ear for additional support in their recovery from mental illness  The PEER LINE is peer-run and peer-friendly  You can call the Peer Line 24 hours a day  Phone: 5-337-DM-PEERS /(1-900.412.4008)   Emergency [address] [facility] Emergency Services: [phone]  [address]  [address] [facility] , [place], [address] [place] [address]     Text CONNECT to 315359 from anywhere in the [place], anytime, about any type of crisis  A live, trained Crisis Counselor receives the text and lets you know that they are here to listen  The volunteer Crisis Counselor will help you move from a hot moment to a cool moment  Warm Line: [phone] 115-3416, [phone], [phone]  If it is not quite a crisis, but you want to talk to someone, 24 hours/day, 7 days/week:  Someone to listen; someone who cares  The [facility] ([facility]) offers various education & support groups for you & your family  For more information visit their website at   http://www "[employer]/     Dial 2-1-1 to get connected/get help  Free, confidential information & referral available 24/7: Aging Services, Child & Youth Services, Counseling, Education/Training, Food/Shelter/Clothing, Health Services, Parenting, Substance Abuse, Support Groups, Volunteer Opportunities, & much more  Phone: 2-1-1 or 920-766-7833, Web: Constantine@import.io, Email: Jean [employer] provides funding to support multiple Recovery Centers in [facility]  These centers offer a safe, sober environment to those in recovery  A variety of programming including 12-Step Meetings, Quiroga Gehrig, Life Skills Workshops, etc  is offered at each location  LEEANNE Singleton [address]  [place], [address] [facility]  [phone]  www  cleanProvidence St. Mary Medical CenterteHonorHealth [facility] Centerr  org      Change on Main  [place], [place] [address] [facility], [address] [facility]  [phone]  treatmenttrends  org/index  php/programs/[place]    [facility]  Nurturing families impacted by substance use  Shellie Kelsey  [phone]  [facility]  [address]  [facility], [address] [place] [address]  [phone]  [facility]       Change on [address], [address] [place]  [phone]  Hours  9:00 am to 5:00 pm Monday thru Friday      Abstinence from addiction is only the beginning  [facility] residents are encouraged to pursue meaningful lives with purpose at the Change on [address]  We provide a safe and sober environment which is staffed by people in recovery, who share similar experiences, and are willing to listen and assist people in early recovery  It takes a coÃmunity to support the recovery process  This [address] [place] [address] recognizes and supports the efforts and investment of those personally in recovery as well as those supporting their efforts  [facility] on [address] offers a safe environment to those seeking recovery and/or who are part of the treatment continuum  Although we are not a treatment facility, we are able to assist those in need, refer members of the center to community resources as needed  We encourage and guide members to pursue meaningful lives with purpose at the Change on [address]   Our hope is that they will find inspiration, encouragement, support through the examples of our volunteers and staff at the center  It takes effort and a dedicated community to support the recovery process  [address] [facility]  are open to all mental health consumers in [facility] who are interested in meeting people and making new friends   They provide a friendly social atmosphere with scheduled daily activities including games, arts & crafts, discussion and education groups, vocational activities, and much more  Light refreshments are served daily  Fosters social growth by providing structured social rehabilitation programming in a safe, culturally sensitive environment, to individuals in recovery from mental illness  The locations are:    [facility] - operated by [address]   [address] [facility] [address] : [phone]   Hours of Operation:  Monday 3:00 PM - 8:00 PM  Tuesday 12:00 PM - 8:00 PM   Wednesday 3:00 PM - 8:00 PM  Thursday 12:00 PM - 8:00 PM   Friday 3:00 PM - 8:00 PM   Saturday [facility] - operated by [address]   [address] [place] [address]: [phone]  Monday through Friday from 9:00am to 7:00pm  ____________________    [facility] [place], operated by [facility] provides funding to support three [address] [place] [address] in [facility]  These centers offer a safe, sober environment to those in recovery  A variety of programming including 12-Step Meetings, Victoriano Kirstie, Life Skills Workshops, art activities, computer access for job searches, job applications, sober events, holiday meals, etc  is offered at each location  [address]  [facility], [address] [facility]  [phone]  [facility] Cent[facility]rends  org/index  php/programs/[facility]  Nurturing families impacted by substance use  [address], [address]  [phone]  www  [facility]  org

## 2022-10-09 NOTE — PROGRESS NOTES
Diagnosis of Severe major depression without psychotic features reviewed  Short term goals for decrease in depressive symptoms, acceptance of need for psychiatric treatment discussed  All parties in agreement and treatment plan signed     10/09/22 3075   Team Meeting   Meeting Type Tx Team Meeting   Team Members Present   Team Members Present Physician;Nurse;   Physician Team Member Dr Phill Toledo Team Member [facility] Management Team Member Myron   Patient/Family Present   Patient Present Yes   Patient's Family Present No

## 2022-10-09 NOTE — TREATMENT TEAM
10/09/22 1000   Team Meeting   Meeting Type Daily Rounds   Team Members Present   Team Members Present Physician;Nurse   Physician Team Member Dr Yane Salguero Team Member Trevon Saavedra   Patient/Family Present   Patient Present No   Patient's Family Present No     AM rounds- Denies SI/HI, calm and cooperative  CIWA-0  Reports feeling bored on the unit, social and attends groups  Reports feeling hopeful with treatment  Slept well

## 2022-10-09 NOTE — NURSING NOTE
Bharti Stein is pleasant and calm upon approach, visible within the community and social with select peers on the unit  Patient denies current SI/HI/AH/VH, remains on CIWA  Maria Victoria's current CIWA = 0  Patient reports sleeping well, endorses positive mood  Maria Victoria reads during free time and attends scheduled groups on the unit, with appropriate participation  Patient signed a 72 hour notice, today, 10/9/22 at 14:34  Bharti Stein was encouraged to seek staff with any issues and/or concerns, verbalized understanding

## 2022-10-09 NOTE — CASE MANAGEMENT
INTAKE    Readmit score:  Yellow 15   Confirmed Address   [address] [place] [address] Road: [address]     Resides in the home with/can return?:    Pt lives with her , 12year old son and 15year old daughter  Pt can return  Confirmed Phone Number: [phone]   Commitment Status/Admitted from: [address] [place] [address] ED   Presenting C/O:             ED note   "  Psychiatric Evaluation        Pt was brought in by her  and her friends  +SI with plan  Plan to trap herself in her car and have carbon monoxide poisoning  No HI  Has access to guns at home  Pt has been drinking alcohol, approx 3 rum & coke's        40-year-old female comes in for psychiatric evaluation  Patient has a history of major depressive disorder  And has made several concerning statements to her  and friends about killing herself  Patient states that she has multiple triggers that make her depression worse  Patient states that she has access to guns at home  She also states that she would trap herself in her car in her garage die from carbon monoxide poisoning  Patient states that she has also been drinking alcohol today  Patient denies taking any other illicit drugs  Patient denies any previous inpatient admission "     Psychiatrist:    Pt does not have psychiatrist - Pt reported that her PCP has been managing her Lexapro for past 8 years and would like to continue  Therapist:    Pt reported she had been seeing a therapist about 1 year ago and does not feel that she needs it for [address]  Pt did ask about OP tx for Alcohol   (Pt declined inpatient rehab and IOP, but is open to once a week to address alcohol abuse )    ACT/ICM/CPS/WRT/SC: N/A   PCP:    DEVONTE Lewis   Phone: [phone]   Work/Income:      Pt is a stay at home mom    Legal/  Probation/[place] Ofc:    Denies   Referrals Needed: Outpatient treatment to address Alcohol abuse     Call PCP to provide an update and that pt will continue with PCP for med management for Lexapro  Transport at Discharge:    Pt reported that her  will pick her up  IMM:   Joe Text DEMETRICE: N/A   Emergency Contact:     Angela Park () [phone]   ROIs obtained:       Angela Vivian ()  D&A OP   [facility] [place]    Insurance:     [facility] through [employer] employer   Audit:        PAWSS:  BAT:  UDS: Negative      Pt reported that she drinks on the weekends but reported "I don't know when to stop once I start "     Pt requested referral to D&A OP upon dc

## 2022-10-09 NOTE — NURSING NOTE
Renetta Elder is pleasant upon approach and visualized in the community  Patient napped for short period of time late this afternoon, and was out in the community more in the evening  Renetta Elder denies current SI/HI/AH/VH, reports reading a book to help pass the time  Patient endorses feeling hopeful and positive r/t treatment  Renetta Elder is attending groups on the unit with appropriate participation  Patient is visualized watching a movie with peers and socializing this evening  Patient remains on CIWA, current CIWA = 0  Renetta Elder was encouraged to seek staff with any issues and/or concerns, verbalized understanding

## 2022-10-09 NOTE — NURSING NOTE
Pt remains pleasant and calm  Denies SI/HI, anxiety and depression  Pt states feeling more insight into alcohol use and would like to stop drinking after discharge stating  She drinks on weekends only  However states "I dont know when to stop when I start and I know that's a problem  Pt states she is considering starting naltrexone which was reviewed with pt  Pt denies any further questions  Remains social with peers and attending groups

## 2022-10-09 NOTE — PLAN OF CARE
Problem: SELF HARM/SUICIDALITY  Goal: Will have no self-injury during hospital stay  Description: INTERVENTIONS:  - Q 15 MINUTES: Routine safety checks  - Q WAKING SHIFT & PRN: Assess risk to determine if routine checks are adequate to maintain patient safety  - Encourage patient to participate actively in care by formulating a plan to combat response to suicidal ideation, identify supports and resources  Outcome: Progressing     Problem: ANXIETY  Goal: Will report anxiety at manageable levels  Description: INTERVENTIONS:  - Administer medication as ordered  - Teach and encourage coping skills  - Provide emotional support  - Assess patient/family for anxiety and ability to cope  Outcome: Progressing     Problem: SLEEP DISTURBANCE  Goal: Will exhibit normal sleeping pattern  Description: Interventions:  -  Assess the patients sleep pattern, noting recent changes  - Administer medication as ordered  - Decrease environmental stimuli, including noise, as appropriate during the night  - Encourage the patient to actively participate in unit groups and or exercise during the day to enhance ability to achieve adequate sleep at night  - Assess the patient, in the morning, encouraging a description of sleep experience  Outcome: Progressing     Problem: Ineffective Coping  Goal: Participates in unit activities  Description: Interventions:  - Provide therapeutic environment   - Provide required programming   - Redirect inappropriate behaviors   Outcome: Progressing

## 2022-10-09 NOTE — DISCHARGE INSTR - APPOINTMENTS
You will be discharged to [address] [place] [address], [address]   You confirmed that your cell phone number is: [phone]          Gordo Barraza or Gerda, cathy Saucedo and Jerel, will be calling you after your discharge, on the phone number that you provided  They will be available as an additional support, if needed  If you wish to speak with one of them, you may contact Ramy Watkins at [phone] or Cayden Mckeon at [phone]

## 2022-10-10 PROBLEM — Z00.8 MEDICAL CLEARANCE FOR PSYCHIATRIC ADMISSION: Status: RESOLVED | Noted: 2022-10-08 | Resolved: 2022-10-10

## 2022-10-10 LAB — RPR SER QL: NORMAL

## 2022-10-10 PROCEDURE — 99232 SBSQ HOSP IP/OBS MODERATE 35: CPT | Performed by: STUDENT IN AN ORGANIZED HEALTH CARE EDUCATION/TRAINING PROGRAM

## 2022-10-10 RX ORDER — NALTREXONE HYDROCHLORIDE 50 MG/1
50 TABLET, FILM COATED ORAL DAILY
Status: DISCONTINUED | OUTPATIENT
Start: 2022-10-10 | End: 2022-10-12 | Stop reason: HOSPADM

## 2022-10-10 RX ADMIN — NALTREXONE HYDROCHLORIDE 50 MG: 50 TABLET, FILM COATED ORAL at 12:00

## 2022-10-10 RX ADMIN — MULTIPLE VITAMINS W/ MINERALS TAB 1 TABLET: TAB ORAL at 09:33

## 2022-10-10 RX ADMIN — ESCITALOPRAM 15 MG: 5 TABLET, FILM COATED ORAL at 09:33

## 2022-10-10 RX ADMIN — FOLIC ACID 1 MG: 1 TABLET ORAL at 09:33

## 2022-10-10 RX ADMIN — Medication 100 MG: at 09:33

## 2022-10-10 NOTE — DISCHARGE SUMMARY
Discharge Summary - [address]imm 50 y o  female MRN: [id number]  Unit/Bed#: [facility] 985-08 Encounter: [id number]     Admission Date: 10/7/2022         Discharge Date: 10/12/22    Attending Psychiatrist: Miguel Angel Zendejas*    Reason for Admission/HPI:     Per initial H&P from Dr Khoury Early on 10/8/22:    "Patient is a 50 y o  female presents with Signs of suicidal potential and Signs/symptoms of alcohol or other substance abuse  Patient was admitted to psychiatric unit on a voluntarily 201 commitment basis      Primary complaints include: feeling depressed  Onset of symptoms was gradual starting a few months ago with gradually worsening course since that time   Psychosocial Stressors: drug and alcohol      Per Izabela Browning "Patient is a 50 yr old female who arrived to the ED via friends / family yesterday seeking support for mental health concerns related to expression of suicidal ideas   On arrival, patient was intoxicated and reportedly was reluctant to exit the vehicle and sign in, but with encouragement, she did so   Alcohol level was elevated, thereby delaying the crisis evaluation  Serge Balderrama is now clinically sober, alert, oriented, pleasant, and cooperative  Serge Balderrama does endorse suicidal ideas, however, she denied plan when questioned   She stated that she has been dealing with depression for many years ("all my life, really") and it is just progressively getting worse with no identifiable precipitant "      Social History     Tobacco History     Smoking Status  Current Every Day Smoker Smoking Frequency  0 5 packs/day for 25 years (12 5 pk yrs) Smoking Tobacco Type  Cigarettes    Smokeless Tobacco Use  Never Used          Alcohol History     Alcohol Use Status  Yes Drinks/Week  8 Shots of liquor per week Amount  8 0 standard drinks of alcohol/wk Comment  4-6 drinks/week          Drug Use     Drug Use Status  Never          Sexual Activity     Sexually Active  Not Asked Activities of Daily Living    Not Asked               Additional Substance Use Detail     Questions Responses    Problems Due to Past Use of Alcohol? Yes    Problems Due to Past Use of Substances? No    Substance Use Assessment Substance use within the past 12 months    Alcohol Use Frequency 1 or 2 times/week    Cannabis frequency Never used    Comment:  Never used on 10/7/2022     Heroin Frequency Denies use in past 12 months    Alcohol Drink of Choice rum    1st Use of Alcohol 10/6/22    Cocaine frequency Never used    Comment:  Never used on 10/7/2022     Crack Cocaine Frequency Denies use in past 12 months    Methamphetamine Frequency Denies use in past 12 months    Narcotic Frequency Denies use in past 12 months    Benzodiazepine Frequency Denies use in past 12 months    Amphetamine frequency Denies use in past 12 months    Barbituate Frequency Denies use use in past 12 months    Inhalant frequency Never used    Comment:  Never used on 10/7/2022     Hallucinogen frequency Never used    Comment:  Never used on 10/7/2022     Ecstasy frequency Never used    Comment:  Never used on 10/7/2022     Other drug frequency Never used    Comment:  Never used on 10/7/2022     Opiate frequency Denies use in past 12 months    Last reviewed by Concha Horn RN on 10/7/2022          Past Medical History:   Diagnosis Date   • Depression    • Shingles      Past Surgical History:   Procedure Laterality Date   •  SECTION     • DILATION AND CURETTAGE OF UTERUS         Medications: All current active medications have been reviewed  Medications prior to admission:    Prior to Admission Medications   Prescriptions Last Dose Informant Patient Reported?  Taking?   escitalopram (LEXAPRO) 10 mg tablet Not Taking at Unknown time Self Yes No   Sig: TAKE 1 TABLET BY MOUTH EVERY DAY   Patient not taking: Reported on 10/10/2022   escitalopram (LEXAPRO) 10 mg tablet Not Taking at Unknown time  Yes No   Sig: Take by mouth   Patient not taking: No sig reported      Facility-Administered Medications: None       Allergies: Allergies   Allergen Reactions   • Bupropion Facial Swelling       Objective     Vital signs in last 24 hours:    Temp:  [97 6 °F (36 4 °C)-98 1 °F (36 7 °C)] 98 1 °F (36 7 °C)  HR:  [56-63] 56  Resp:  [18] 18  BP: (107-114)/(73-79) 114/79    No intake or output data in the 24 hours ending 10/12/22 Lona Boyd was admitted to the inpatient psychiatric unit and started on [address] checks every 7 minutes  During the hospitalization she was encouraged to attend individual therapy, group therapy, milieu therapy and occupational therapy  Psychiatric medications were adjusted over the hospital stay  To address depressive symptoms and potential alcohol withdrawal symptoms, Tony was treated with antidepressant Lexapro and medications to control alcohol withdrawal Thiamine, Folic Acid, Multivitamin and Naltrexone  Medication doses were adjusted during the hospital course  Lexapro was adjusted to 15mg qhs  Naltrexone was added at the dose of 50mg daily  Prior to beginning of treatment medications risks and benefits and possible side effects including risk of suicidality and serotonin syndrome related to treatment with antidepressants were reviewed with Maria Victoria  She verbalized understanding and agreement for treatment  Upon admission Tony was seen by medical service for medical clearance for inpatient treatment and medical follow up  Maria Victoria's symptoms slowly improved over the hospital course  Initially after admission she was still feeling depressed and overwhelmed  With adjustment of medications and therapeutic milieu her symptoms gradually improved  Patient signed a 72 hour notice voluntarily withdrawing themselves from treatment  At the end of treatment Maria Victoria was improved  Her mood was more stable at the time of discharge   Tony denied suicidal ideation, intent or plan at the time of discharge and denied homicidal ideation, intent or plan at the time of discharge  Elizabeth Madden was participating appropriately in milieu at the time of discharge  Behavior was appropriate on the unit at the time of discharge  Sleep and appetite were improved  Elizabeth Madden was tolerating medications and was not reporting any significant side effects at the time of discharge  Maria Victoria signed 72 hour notice and requested discharge  At the time of the 72 hour notice expiration Maria Victoria had no criteria for involuntary commitment and denied any suicidal or homicidal ideation  Patient was attending to all ADLs, taking medications appropriately, eating meals, and not demonstrating any clinical stigmata of acute psychosis  At the time of discharge, they were goal oriented, forward thinking and optimistic about the future  Mental Status at Time of Discharge:     Appearance: casually dressed, appears consistent with stated age, normal grooming  Motor: no psychomotor disturbances, no gait abnormalities  Behavior: calm, cooperative, interacts with this writer appropriately  Speech: normal rate, rhythm, and volume  Mood: "better" less depressed  Affect: more appropriate, normal range and intensity, mood-congruent, no longer overbright  Thought Process: organized, linear, and goal-oriented; intact associations  Thought Content: denies any delusional material, no preoccupation  Perception: denies any auditory or visual hallucinations, denies other perceptual disturbances  Risk Potential: denies suicidal ideation, plan, or intent   Denies homicidal ideation  Sensorium: Oriented to person, place, time, and situation  Cognition: cognitive ability appears intact but was not quantitatively tested  Consciousness: alert and awake  Attention/Concentration: able to focus without difficulty, attention and concentration are age appropriate  Insight: improved  Judgement: improved    Admission Diagnosis:    Principal Problem:    Severe major depression without psychotic features ([facility] )  Active Problems:    Alcohol use      Discharge Diagnosis:     Principal Problem:    Severe major depression without psychotic features ([facility] )  Active Problems:    Alcohol use  Resolved Problems:    Medical clearance for psychiatric admission      Lab Results:   I have personally reviewed all pertinent laboratory/tests results  Most Recent Labs:   Lab Results   Component Value Date    WBC 5 74 10/08/2022    RBC 4 27 10/08/2022    HGB 11 6 10/08/2022    HCT 35 8 10/08/2022     10/08/2022    RDW 14 0 10/08/2022    NEUTROABS 3 44 10/08/2022    SODIUM 141 10/08/2022    K 3 9 10/08/2022     10/08/2022    CO2 26 10/08/2022    BUN 15 10/08/2022    CREATININE 0 79 10/08/2022    GLUC 113 10/08/2022    GLUF 113 (H) 10/08/2022    CALCIUM 9 1 10/08/2022    AST 31 10/08/2022    ALT 17 10/08/2022    ALKPHOS 46 10/08/2022    TP 7 2 10/08/2022    ALB 3 6 10/08/2022    TBILI 0 40 10/08/2022    CHOLESTEROL 197 10/08/2022     10/08/2022    TRIG 67 10/08/2022    LDLCALC 83 10/08/2022    NONHDLC 96 10/08/2022    YFQ4TYYPGDWH 1 044 10/08/2022    PREGUR negative 10/06/2022    PREGSERUM Negative 10/08/2022    RPR Non-Reactive 10/08/2022    HGBA1C 5 5 10/08/2022     10/08/2022       Discharge Medications:    See after visit summary for all reconciled discharge medications provided to patient and family  Discharge instructions/Information to patient and family:     See after visit summary for information provided to patient and family  Provisions for Follow-Up Care:    See after visit summary for information related to follow-up care and any pertinent home health orders  Discharge Statement:    I spent 35 minutes discharging the patient  This time was spent on the day of discharge  I had direct contact with the patient on the day of discharge       Additional documentation is required if more than 30 minutes were spent on discharge:    I reviewed with Maria Victoria bell of compliance with medications and outpatient treatment after discharge  I discussed the medication regimen and possible side effects of the medications with Maria Victoria prior to discharge  At the time of discharge she was tolerating psychiatric medications  I discussed outpatient follow up with Antoni Floyd  I reviewed with Maria Victoria crisis plan and safety plan upon discharge  Antoni Floyd agreed to abstain from drug and alcohol use after discharge  Antoni Floyd was competent to understand risks and benefits of withholding information and risks and benefits of her actions  Maria Victoria signed 72 hour notice and requested discharge  At the time of the 72 hour notice expiration she had no criteria for involuntary commitment and denied any suicidal or homicidal ideation      Discharge on Two Antipsychotic Medications: Allison Payne PA-C 10/12/22

## 2022-10-10 NOTE — ED NOTES
Insurance Authorization for admission:   Voice mail received from: Camille Yusuf with [employer]  Phone number: [phone] ext [phone]      5 days approved  Level of care: inpatient mental health 201  Review on 10/11/2022  Authorization #: [id number]  Voice mail indicated she would leave a message for Chary Mccray as well

## 2022-10-10 NOTE — BH TRANSITION RECORD
Contact Information: If you have any questions, concerns, pended studies, tests and/or procedures, or emergencies regarding your inpatient behavioral health visit  Please contact [facility] unit [phone] and ask to speak to a , nurse or physician  A contact is available 24 hours/ 7 days a week at this number  Summary of Procedures Performed During your Stay:  Below is a list of major procedures performed during your hospital stay and a summary of results:  - Cardiac Procedures/Studies: ekg  Pending Studies (From admission, onward)    None        Please follow up on the above pending studies with your PCP and/or referring provider

## 2022-10-10 NOTE — NURSING NOTE
Pt is pleasant and cooperative with bright affect during interaction  Reports still having some difficulty falling asleep but otherwise is sleeping well  Reports having a good weekend on the unit and enjoyed socializing with peers  Discussed plan for for 1 week program upon discharge for alcohol dependence    Expresses readiness for discharge, denies SI

## 2022-10-10 NOTE — TREATMENT TEAM
10/10/22 1000   Activity/Group Checklist   Group Community meeting   Attendance Attended   Attendance Duration (min) 16-30   Interactions Interacted appropriately   Affect/Mood Appropriate   Goals Achieved Able to engage in interactions; Able to listen to others; Other (Comment)  (identified goals for the day)

## 2022-10-10 NOTE — TREATMENT TEAM
10/10/22 1230   Activity/Group Checklist   Group Life Skills   Attendance Attended   Attendance Duration (min) 16-30   Interactions Interacted appropriately   Affect/Mood Appropriate   Goals Achieved Able to listen to others; Able to engage in interactions; Identified feelings

## 2022-10-10 NOTE — NURSING NOTE
Corean Shown is pleasant upon approach and visible within the community  Patient denies current SI/HI/AH/VH  Corean Shown is social with select peers and reading a book during free time  Patient attends scheduled groups on the unit with appropriate participation  Corean Shown reports mood has improved since time of admission, exhibits bright affect and reports hopefulness for the future  Corean Shown expresses readiness for discharge, tentatively scheduled for Wednesday  Patient encouraged to seek staff with any issues and/or concerns, verbalized understanding

## 2022-10-10 NOTE — TREATMENT TEAM
10/10/22 1420   Activity/Group Checklist   Group Admission/Discharge   Attendance Attended  (pt filled out the admission self assessment and relapse prevention plan forms   once completed, she had no questions for this therapist at this time )   Attendance Duration (min) 0-15   Interactions Interacted appropriately   Affect/Mood Appropriate   Goals Achieved Able to engage in interactions

## 2022-10-10 NOTE — PROGRESS NOTES
Progress Note - [address]  Stayazmin 50 y o  female MRN: [id number]   Unit/Bed#: [facility] 254-02 Encounter: [id number]    Behavior over the last 24 hours: hayden Jimenez is a [id number] female with a history of MDD and alcohol abuse who presents for psychiatric follow-up  Staff reports no behavioral issues overnight  She signed a 72 hour notice requesting voluntary withdrawal from treatment yesterday afternoon  She is pleasant, calm and cooperative upon approach with and over bright affect  She states her mood is better and she no longer feels hopeless, down, worthless or excessively guilty  She does express remorse for the statements that she made while she was intoxicated, however, she states that she had been drinking “for a long time to cover up the way that I was feeling  I knew I needed to get help and last week was the straw that broke the camel's back  I am glad that I came here to get help ” Her Lexapro was increased to 50 mg daily and she reports doing well  She denies any nicotine or alcohol withdrawal symptoms or cravings  She states that when she drinks she “loses control,” and 1 or 2 drinks snow balls into a heavy binge drinking episode which she later regrets  She is interested in starting naltrexone and spoke with the psychiatrist yesterday about this  She would like to discuss further  No SI or HI  No AH or VH  Medication and meal compliant      Sleep: normal  Appetite: normal  Medication side effects: No   ROS: all other systems are negative    Mental Status Evaluation:    Appearance: casually dressed, appears consistent with stated age, normal grooming  Motor: no psychomotor disturbances, no gait abnormalities  Behavior: calm, cooperative, interacts with this writer appropriately  Speech: normal rate, rhythm, and volume  Mood: "much better"  Affect: overbright, smiling thoughout encounter, mood-congruent  Thought Process: organized, linear, and goal-oriented; intact associations  Thought Content: denies any delusional material, no preoccupation  Perception: denies any auditory or visual hallucinations, denies other perceptual disturbances  Risk Potential: denies suicidal ideation, plan, or intent  Denies homicidal ideation  Sensorium: Oriented to person, place, time, and situation  Cognition: cognitive ability appears intact but was not quantitatively tested  Consciousness: alert and awake  Attention/Concentration: able to focus without difficulty, attention and concentration are age appropriate  Insight:  Improving  Judgement:  Fair    Vital signs in last 24 hours:    Temp:  [97 8 °F (36 6 °C)-98 7 °F (37 1 °C)] 97 8 °F (36 6 °C)  HR:  [54-77] 64  Resp:  [18] 18  BP: (107-142)/(76-87) 128/87    Laboratory results: I have personally reviewed all pertinent laboratory/tests results    Results from the past 24 hours: No results found for this or any previous visit (from the past 24 hour(s))    Most Recent Labs:   Lab Results   Component Value Date    WBC 5 74 10/08/2022    RBC 4 27 10/08/2022    HGB 11 6 10/08/2022    HCT 35 8 10/08/2022     10/08/2022    RDW 14 0 10/08/2022    NEUTROABS 3 44 10/08/2022    SODIUM 141 10/08/2022    K 3 9 10/08/2022     10/08/2022    CO2 26 10/08/2022    BUN 15 10/08/2022    CREATININE 0 79 10/08/2022    GLUC 113 10/08/2022    CALCIUM 9 1 10/08/2022    AST 31 10/08/2022    ALT 17 10/08/2022    ALKPHOS 46 10/08/2022    TP 7 2 10/08/2022    ALB 3 6 10/08/2022    TBILI 0 40 10/08/2022    CHOLESTEROL 197 10/08/2022     10/08/2022    TRIG 67 10/08/2022    LDLCALC 83 10/08/2022    NONHDLC 96 10/08/2022    [id number] 1 044 10/08/2022    PREGUR negative 10/06/2022    PREGSERUM Negative 10/08/2022    RPR Non-Reactive 10/08/2022    HGBA1C 5 5 10/08/2022     10/08/2022       Progress Toward Goals: progressing    Assessment/Plan   Principal Problem:    Severe major depression without psychotic features ([facility] )  Active Problems: Alcohol use    Medical clearance for psychiatric admission      Recommended Treatment:     Planned medication and treatment changes: All current active medications have been reviewed  Encourage group therapy, milieu therapy and occupational therapy  Behavioral Health checks every 7 minutes    Start naltrexone 50 mg daily for alcohol use disorder, and specifically, to reduce frequency of binge drinking episodes  Side effects discussed  LFTs are within normal limits  Continue all other medications  Has not displayed 302 behavior, expected discharge likely Wednesday morning prior to expiration of 72 hour notice      Current Facility-Administered Medications   Medication Dose Route Frequency Provider Last Rate   • aluminum-magnesium hydroxide-simethicone  30 mL Oral Q4H PRN Azra Smith PA-C     • artificial tear  1 application Both Eyes P5M PRN Azra Smith PA-C     • benztropine  1 mg Intramuscular Q4H PRN Max 6/day Azra Smith PA-C     • benztropine  1 mg Oral Q4H PRN Max 6/day Azra Smith PA-C     • bisacodyl  10 mg Rectal Daily PRN Thao Haq MD     • hydrOXYzine HCL  50 mg Oral Q6H PRN Max 4/day Azra Smith PA-C      Or   • diphenhydrAMINE  50 mg Intramuscular Q6H PRN Azra Smith PA-C     • escitalopram  15 mg Oral Daily Tyson Law MD     • folic acid  1 mg Oral Daily Azra Smith PA-C     • hydrOXYzine HCL  100 mg Oral Q6H PRN Max 4/day Azra Smith PA-C      Or   • LORazepam  2 mg Intramuscular Q6H PRN Azra Smith PA-C     • hydrOXYzine HCL  25 mg Oral Q6H PRN Max 4/day Azra Smith PA-C     • ibuprofen  400 mg Oral Q4H PRN Azra Smith PA-C     • ibuprofen  600 mg Oral Q6H PRN Azra Smith PA-C     • ibuprofen  800 mg Oral Q8H PRN Azra Smith PA-C     • LORazepam  2 mg Oral Q4H PRN Azra Smith PA-C     • multivitamin-minerals  1 tablet Oral Daily Azra Smith PA-C     • naltrexone  50 mg Oral Daily Marina Campo Elen Akers PA-C     • nicotine polacrilex  4 mg Oral Q2H PRN Murl Song, LETI     • OLANZapine  5 mg Oral Q4H PRN Max 3/day Murl SongLETI      Or   • OLANZapine  2 5 mg Intramuscular Q4H PRN Max 3/day Murl Song, LETI     • OLANZapine  5 mg Oral Q3H PRN Max 3/day Murl SongLETI      Or   • OLANZapine  5 mg Intramuscular Q3H PRN Max 3/day Murl Song, LETI     • OLANZapine  2 5 mg Oral Q4H PRN Max 6/day Murl SongLEIT     • polyethylene glycol  17 g Oral Daily PRN Murl Song, LETI     • senna-docusate sodium  1 tablet Oral Daily PRN Murl Song, LETI     • thiamine  100 mg Oral Daily Murl Song, LETI     • traZODone  50 mg Oral HS PRN Murl Song, LETI       Risks / Benefits of Treatment:    Risks, benefits, and possible side effects of medications explained to patient and patient verbalizes understanding and agreement for treatment  Counseling / Coordination of Care:    Patient's progress discussed with staff in treatment team meeting  Medications, treatment progress and treatment plan reviewed with patient      Dalila Castañeda PA-C 10/10/22

## 2022-10-10 NOTE — TREATMENT TEAM
10/10/22 6790   Activity/Group Checklist   Group Other (Comment)  (art therapy)   Attendance Attended   Attendance Duration (min) 46-60   Interactions Interacted appropriately   Affect/Mood Appropriate   Goals Achieved Able to engage in interactions; Able to listen to others; Other (Comment)  (authentic, spontnaeous self expression, connection, and insight)

## 2022-10-11 PROCEDURE — 99232 SBSQ HOSP IP/OBS MODERATE 35: CPT | Performed by: STUDENT IN AN ORGANIZED HEALTH CARE EDUCATION/TRAINING PROGRAM

## 2022-10-11 RX ADMIN — ESCITALOPRAM 15 MG: 5 TABLET, FILM COATED ORAL at 09:24

## 2022-10-11 RX ADMIN — NALTREXONE HYDROCHLORIDE 50 MG: 50 TABLET, FILM COATED ORAL at 09:24

## 2022-10-11 RX ADMIN — Medication 100 MG: at 09:24

## 2022-10-11 RX ADMIN — FOLIC ACID 1 MG: 1 TABLET ORAL at 09:24

## 2022-10-11 RX ADMIN — MULTIPLE VITAMINS W/ MINERALS TAB 1 TABLET: TAB ORAL at 09:24

## 2022-10-11 NOTE — TREATMENT TEAM
10/11/22 4830   Activity/Group Checklist   Group Other (Comment)  (art therapy)   Attendance Attended   Attendance Duration (min) 46-60   Interactions Interacted appropriately   Affect/Mood Appropriate   Goals Achieved Able to engage in interactions; Able to listen to others; Other (Comment)  (authentic, spontaneous self expression, connection, and insight)

## 2022-10-11 NOTE — TREATMENT TEAM
10/11/22 1230   Activity/Group Checklist   Group Life Skills   Attendance Attended   Attendance Duration (min) 16-30   Interactions Interacted appropriately   Affect/Mood Appropriate   Goals Achieved Identified feelings; Able to listen to others; Able to engage in interactions

## 2022-10-11 NOTE — PROGRESS NOTES
Progress Note - 30 [address]  Ismael 50 y o  female MRN: [id number]   Unit/Bed#: [facility] [place] 254-02 Encounter: [id number]    Behavior over the last 24 hours: improving  Abel Bray is a [id number] female with a history of MDD and alcohol abuse who presents for psychiatric follow-up  Staff reports no behavioral issues overnight  She signed a 72 hour notice due to  tomorrow  She is pleasant, calm and cooperative upon approach  Reports mood is improved and she is feeling more hopeful and optimistic about the future  She is goal oriented, less depressed, more energetic, and more social   She is tolerating medications without any issues  Naltrexone was started for alcohol abuse and she notes no adverse drug reactions  She states that she has a good support system with her family and friends but would like to participate in aftercare with either a therapist or psychiatrist, plants continue medications and follow up with her PCP as well  Adamantly denying any SI or HI  No AH or VH  Sleep: normal  Appetite: normal  Medication side effects: No   ROS: all other systems are negative    Mental Status Evaluation:    Appearance: casually dressed, appears consistent with stated age, normal grooming  Motor: no psychomotor disturbances, no gait abnormalities  Behavior: calm, cooperative, interacts with this writer appropriately  Speech: normal rate, rhythm, and volume  Mood: "Much better" less depressed  Affect:  Still overbright  Thought Process: organized, linear, and goal-oriented; intact associations  Thought Content: denies any delusional material, no preoccupation  Perception: denies any auditory or visual hallucinations, denies other perceptual disturbances  Risk Potential:  Adamantly denies suicidal ideation, plan, or intent   Denies homicidal ideation  Sensorium: Oriented to person, place, time, and situation  Cognition: cognitive ability appears intact but was not quantitatively tested  Consciousness: alert and awake  Attention/Concentration: able to focus without difficulty, attention and concentration are age appropriate  Insight:  Fair  Judgement:  Fair    Vital signs in last 24 hours:    Temp:  [98 1 °F (36 7 °C)-99 4 °F (37 4 °C)] 99 4 °F (37 4 °C)  HR:  [58-66] 58  Resp:  [16-18] 18  BP: (106-115)/(66-68) 115/68    Laboratory results: I have personally reviewed all pertinent laboratory/tests results    Results from the past 24 hours: No results found for this or any previous visit (from the past 24 hour(s))  Most Recent Labs:   Lab Results   Component Value Date    WBC 5 74 10/08/2022    RBC 4 27 10/08/2022    HGB 11 6 10/08/2022    HCT 35 8 10/08/2022     10/08/2022    RDW 14 0 10/08/2022    NEUTROABS 3 44 10/08/2022    SODIUM 141 10/08/2022    K 3 9 10/08/2022     10/08/2022    CO2 26 10/08/2022    BUN 15 10/08/2022    CREATININE 0 79 10/08/2022    GLUC 113 10/08/2022    CALCIUM 9 1 10/08/2022    AST 31 10/08/2022    ALT 17 10/08/2022    ALKPHOS 46 10/08/2022    TP 7 2 10/08/2022    ALB 3 6 10/08/2022    TBILI 0 40 10/08/2022    CHOLESTEROL 197 10/08/2022     10/08/2022    TRIG 67 10/08/2022    LDLCALC 83 10/08/2022    NONHDLC 96 10/08/2022    FZT5ULFHUQUL 1 044 10/08/2022    PREGUR negative 10/06/2022    PREGSERUM Negative 10/08/2022    RPR Non-Reactive 10/08/2022    HGBA1C 5 5 10/08/2022     10/08/2022       Progress Toward Goals: progressing, working on coping skills, discharge planning    Assessment/Plan   Principal Problem:    Severe major depression without psychotic features ([facility] [address] )  Active Problems:    Alcohol use      Recommended Treatment:     Planned medication and treatment changes: All current active medications have been reviewed  Encourage group therapy, milieu therapy and occupational therapy  Behavioral Health checks every 7 minutes    Continue current medications    Patient progressing well, discharge planning for tomorrow prior to expiration of 72 hour notice  No grounds for involuntary commitment currently      Current Facility-Administered Medications   Medication Dose Route Frequency Provider Last Rate   • aluminum-magnesium hydroxide-simethicone  30 mL Oral Q4H PRN Allyne Hand, PA-C     • artificial tear  1 application Both Eyes O0D PRN Allyne Hand, PA-C     • benztropine  1 mg Intramuscular Q4H PRN Max 6/day Allyne Hand, PA-C     • benztropine  1 mg Oral Q4H PRN Max 6/day Allyne Hand, PA-C     • bisacodyl  10 mg Rectal Daily PRN Eliana Steinberg MD     • hydrOXYzine HCL  50 mg Oral Q6H PRN Max 4/day Allyne Hand, PA-C      Or   • diphenhydrAMINE  50 mg Intramuscular Q6H PRN Allyne Hand, PA-C     • escitalopram  15 mg Oral Daily Hu Lopes MD     • folic acid  1 mg Oral Daily Allyne Hand, PA-C     • hydrOXYzine HCL  100 mg Oral Q6H PRN Max 4/day Allyne Hand, PA-C      Or   • LORazepam  2 mg Intramuscular Q6H PRN Allyne Hand, PA-C     • hydrOXYzine HCL  25 mg Oral Q6H PRN Max 4/day Allyne Hand, PA-C     • ibuprofen  400 mg Oral Q4H PRN Allyne Hand, PA-C     • ibuprofen  600 mg Oral Q6H PRN Allyne Hand, PA-C     • ibuprofen  800 mg Oral Q8H PRN Allyne Hand, PA-C     • LORazepam  2 mg Oral Q4H PRN Allyne Hand, PA-C     • multivitamin-minerals  1 tablet Oral Daily Allyne Hand, PA-C     • naltrexone  50 mg Oral Daily Allyne Hand, PA-C     • nicotine polacrilex  4 mg Oral Q2H PRN Allyne Hand, PA-C     • OLANZapine  5 mg Oral Q4H PRN Max 3/day Allyne Hand, PA-C      Or   • OLANZapine  2 5 mg Intramuscular Q4H PRN Max 3/day Allyne Hand, PA-C     • OLANZapine  5 mg Oral Q3H PRN Max 3/day Allyne Hand, PA-C      Or   • OLANZapine  5 mg Intramuscular Q3H PRN Max 3/day Allyne Hand, PA-C     • OLANZapine  2 5 mg Oral Q4H PRN Max 6/day Allyne Hand, PA-C     • polyethylene glycol  17 g Oral Daily PRN Allyne Hand, PA-C     • senna-docusate sodium  1 tablet Oral Daily PRN Vivi Madrid PA-C     • thiamine  100 mg Oral Daily Vivi Madrid PA-C     • traZODone  50 mg Oral HS PRN Vivi Madrid PA-C       Risks / Benefits of Treatment:    Risks, benefits, and possible side effects of medications explained to patient and patient verbalizes understanding and agreement for treatment  Counseling / Coordination of Care:    Patient's progress discussed with staff in treatment team meeting  Medications, treatment progress and treatment plan reviewed with patient      Vivi Madrid PA-C 10/11/22

## 2022-10-11 NOTE — TREATMENT TEAM
10/11/22 1000   Activity/Group Checklist   Group Community meeting   Attendance Attended   Attendance Duration (min) 16-30   Interactions Interacted appropriately   Affect/Mood Appropriate;Calm   Goals Achieved Able to listen to others; Able to engage in interactions; Other (Comment)  (identified goals for the day)

## 2022-10-11 NOTE — CASE MANAGEMENT
CM called [facility] and left  for new  [facility] to see if they accept pt insurance and can schedule her for an intake for D&A OP for specifically Alcohol  CM asked for a call back  CM called [address] [facility] ([phone]) and spoke to Maggy Orellana and she reported they do not accept pt [facility]  CM called [address] ([phone]) and left  asking for a call back     CM called [address] ([phone]) and left voicemail for [facility] asking for a call back to see if they can schedule pt for an intake        CM called [facility] - ([phone]) - spoke to [facility] and she was able to schedule pt for intake on WALK-IN Appointment on Thursday 10/13/22 from 8am-3pm (advised pt to go at 8am to get on the list to be seen)

## 2022-10-11 NOTE — NURSING NOTE
Pt is pleasant and cooperative during interaction  Reports sleeping well overnight  Looking forward to discharge tomorrow and seeing her children  Tolerating naltrexone thus far, denies any side effects  Visible and social on unit    Denies SI

## 2022-10-12 VITALS
WEIGHT: 136.69 LBS | HEIGHT: 60 IN | RESPIRATION RATE: 18 BRPM | BODY MASS INDEX: 26.84 KG/M2 | HEART RATE: 56 BPM | OXYGEN SATURATION: 99 % | SYSTOLIC BLOOD PRESSURE: 114 MMHG | TEMPERATURE: 98.1 F | DIASTOLIC BLOOD PRESSURE: 79 MMHG

## 2022-10-12 PROCEDURE — 99239 HOSP IP/OBS DSCHRG MGMT >30: CPT | Performed by: STUDENT IN AN ORGANIZED HEALTH CARE EDUCATION/TRAINING PROGRAM

## 2022-10-12 RX ORDER — FOLIC ACID 1 MG/1
1 TABLET ORAL DAILY
Qty: 30 TABLET | Refills: 0 | Status: SHIPPED | OUTPATIENT
Start: 2022-10-12 | End: 2022-11-11

## 2022-10-12 RX ORDER — ESCITALOPRAM OXALATE 10 MG/1
10 TABLET ORAL DAILY
Qty: 30 TABLET | Refills: 1 | Status: SHIPPED | OUTPATIENT
Start: 2022-10-12 | End: 2022-12-11

## 2022-10-12 RX ORDER — NALTREXONE HYDROCHLORIDE 50 MG/1
50 TABLET, FILM COATED ORAL DAILY
Qty: 30 TABLET | Refills: 1 | Status: SHIPPED | OUTPATIENT
Start: 2022-10-12 | End: 2022-12-11

## 2022-10-12 RX ORDER — THIAMINE MONONITRATE (VIT B1) 100 MG
100 TABLET ORAL DAILY
Qty: 30 TABLET | Refills: 0 | Status: SHIPPED | OUTPATIENT
Start: 2022-10-12 | End: 2022-11-11

## 2022-10-12 RX ORDER — ESCITALOPRAM OXALATE 5 MG/1
5 TABLET ORAL DAILY
Qty: 30 TABLET | Refills: 1 | Status: SHIPPED | OUTPATIENT
Start: 2022-10-12 | End: 2022-12-11

## 2022-10-12 RX ADMIN — MULTIPLE VITAMINS W/ MINERALS TAB 1 TABLET: TAB ORAL at 08:27

## 2022-10-12 RX ADMIN — ESCITALOPRAM 15 MG: 5 TABLET, FILM COATED ORAL at 08:27

## 2022-10-12 RX ADMIN — Medication 100 MG: at 08:27

## 2022-10-12 RX ADMIN — FOLIC ACID 1 MG: 1 TABLET ORAL at 08:27

## 2022-10-12 RX ADMIN — NALTREXONE HYDROCHLORIDE 50 MG: 50 TABLET, FILM COATED ORAL at 08:27

## 2022-10-12 NOTE — PROGRESS NOTES
New admission - 201 from [facility]  SI with plan to use Carbon Monoxide  Reported that her  and friends encouraged her to come in  Pt reported chronic depression, low self worth  Hx of SA 1 year ago  Denies withdrawal symptoms  CIWA-0      DC: Wednesday - signed 72 hour notice on 10/9/22 @ [phone]     10/10/22 0800   Team Meeting   Meeting Type Daily Rounds   Team Members Present   Team Members Present Physician;Nurse;; Other (Discipline and Name)   Physician Team Member Dr Sera Ramírez / Michael Chaudhary / Belia Heath Team Member Eva Pineda / Deion Ramos Management Team Member Corina Cabello / Lisa Borden   Other (Discipline and Name) Capri Burris - Art Therapy   Patient/Family Present   Patient Present No   Patient's Family Present No

## 2022-10-12 NOTE — PLAN OF CARE
Problem: SELF HARM/SUICIDALITY  Goal: Will have no self-injury during hospital stay  Description: INTERVENTIONS:  - Q 15 MINUTES: Routine safety checks  - Q WAKING SHIFT & PRN: Assess risk to determine if routine checks are adequate to maintain patient safety  - Encourage patient to participate actively in care by formulating a plan to combat response to suicidal ideation, identify supports and resources  Outcome: Adequate for Discharge     Problem: DEPRESSION  Goal: Will be euthymic at discharge  Description: INTERVENTIONS:  - Administer medication as ordered  - Provide emotional support via 1:1 interaction with staff  - Encourage involvement in milieu/groups/activities  - Monitor for social isolation  Outcome: Adequate for Discharge     Problem: ANXIETY  Goal: Will report anxiety at manageable levels  Description: INTERVENTIONS:  - Administer medication as ordered  - Teach and encourage coping skills  - Provide emotional support  - Assess patient/family for anxiety and ability to cope  Outcome: Adequate for Discharge  Goal: By discharge: Patient will verbalize 2 strategies to deal with anxiety  Description: Interventions:  - Identify any obvious source/trigger to anxiety  - Staff will assist patient in applying identified coping technique/skills  - Encourage attendance of scheduled groups and activities  Outcome: Adequate for Discharge     Problem: SLEEP DISTURBANCE  Goal: Will exhibit normal sleeping pattern  Description: Interventions:  -  Assess the patients sleep pattern, noting recent changes  - Administer medication as ordered  - Decrease environmental stimuli, including noise, as appropriate during the night  - Encourage the patient to actively participate in unit groups and or exercise during the day to enhance ability to achieve adequate sleep at night  - Assess the patient, in the morning, encouraging a description of sleep experience  Outcome: Adequate for Discharge     Problem: Ineffective Coping  Goal: Participates in unit activities  Description: Interventions:  - Provide therapeutic environment   - Provide required programming   - Redirect inappropriate behaviors   Outcome: Adequate for Discharge     Problem: DISCHARGE PLANNING  Goal: Discharge to home or other facility with appropriate resources  Description: INTERVENTIONS:  - Identify barriers to discharge w/patient and caregiver  - Arrange for needed discharge resources and transportation as appropriate  - Identify discharge learning needs (meds, wound care, etc )  - Arrange for interpretive services to assist at discharge as needed  - Refer to Case Management Department for coordinating discharge planning if the patient needs post-hospital services based on physician/advanced practitioner order or complex needs related to functional status, cognitive ability, or social support system  Outcome: Adequate for Discharge

## 2022-10-12 NOTE — NURSING NOTE
Pt remains pleasant and calm  Denies SI/HI, reports anxiety and depression has been improving throughout the day  Reports she slept well overnight  Denies any questions and reports feeling hopeful for discharge today

## 2022-10-12 NOTE — PROGRESS NOTES
Pt pleasant and reported feeling ready for dc  Pt social with peers and staff  Pt showing more insight into her MH  Pt talked about going to [facility]  DC: Wednesday      10/11/22 0806   Team Meeting   Meeting Type Daily Rounds   Team Members Present   Team Members Present Physician;Nurse;; Other (Discipline and Name)   Physician Team Member Dr Meka Hayward / Jeffy Barker / Merlin Ricker Team Member Alicia Pastrana / Dayanna Chauhan Management Team Member Deedee Grimm   Other (Discipline and Name) Blake Thibodeaux Art Therapy   Patient/Family Present   Patient Present No   Patient's Family Present No

## 2022-10-12 NOTE — NURSING NOTE
Norma Arzola has been bright, polite and social with others throughout the day, reporting readiness for discharge tomorrow and denies all questions or concerns at this time  Norma Arzola also reports feeling safe and able to use coping skills upon departure from the unit

## 2022-10-12 NOTE — PROGRESS NOTES
Pt has shown improved insight, bright, pleasant, social with peers and staff  DC: Today - pt  will pick her up at 10am      10/12/22 0802   Team Meeting   Meeting Type Daily Rounds   Team Members Present   Team Members Present Physician;Nurse;; Other (Discipline and Name)   Physician Team Member Dr Sera Ramírez / Michael Chaudhary / Belia Heath Team Member Eva Pineda / Deion Ramos Management Team Member Corina Cabello / Trang Cummings   Patient/Family Present   Patient Present No   Patient's Family Present No

## 2022-10-12 NOTE — CASE MANAGEMENT
CM met with pt to check in about dc plans for today  Pt smiling upon approach and talking with roommate   Pt reported she is feeling ready for dc today and  will pick her up today at 10am      Pt reported she plans to go to [facility] for walk-in hours for D&A OP next Thursday 10/20/22  (pt reported she will not be able to go tomorrow 10/13/22)

## 2023-07-26 ENCOUNTER — OFFICE VISIT (OUTPATIENT)
Dept: URGENT CARE | Facility: MEDICAL CENTER | Age: 48
End: 2023-07-26
Payer: COMMERCIAL

## 2023-07-26 VITALS
TEMPERATURE: 97.8 F | HEIGHT: 60 IN | RESPIRATION RATE: 18 BRPM | BODY MASS INDEX: 25.52 KG/M2 | DIASTOLIC BLOOD PRESSURE: 85 MMHG | HEART RATE: 86 BPM | SYSTOLIC BLOOD PRESSURE: 134 MMHG | WEIGHT: 130 LBS | OXYGEN SATURATION: 99 %

## 2023-07-26 DIAGNOSIS — R21 RASH: Primary | ICD-10-CM

## 2023-07-26 PROCEDURE — 99213 OFFICE O/P EST LOW 20 MIN: CPT | Performed by: FAMILY MEDICINE

## 2023-07-26 RX ORDER — VENLAFAXINE HYDROCHLORIDE 150 MG/1
1 CAPSULE, EXTENDED RELEASE ORAL DAILY
COMMUNITY
Start: 2023-06-21

## 2023-07-26 NOTE — PROGRESS NOTES
[facility]        NAME: Sheba Huff is a 46 y.o. female  : 1975    MRN: [id number]  DATE: 2023  TIME: 12:45 PM    Assessment and Plan   Rash [R21]  1. Rash  Ambulatory Referral to Dermatology          Reassured her the rash is not shingles  Patient Instructions     Consider dermatology referral  Follow up with PCP in 3-5 days. Proceed to  ER if symptoms worsen. Chief Complaint     Chief Complaint   Patient presents with   • Rash     Pt having a red rash across her back only hurts to the touch x1 month, pt concerned for shingles as she has had it in the past.          History of Present Illness       Patient complains of a rash on her back. Started a month ago. Not very itchy. Has discomfort with rubbing it or in a shower. There are 2 spots which her family has observed hwithout much change. No fever, chillls, resp or GI symptoms. Feels fine otherwise. No new meds, topical agents, etc. She is normally prone to bruising. Concerned she has shingles. Review of Systems   Review of Systems   Constitutional: Negative for chills and fever. Hematological: Negative for adenopathy.          Current Medications       Current Outpatient Medications:   •  Cholecalciferol 50 MCG (2000 UT) CAPS, Take 1 capsule by mouth, Disp: , Rfl:   •  Melatonin 3 MG CAPS, Take 3 mg by mouth, Disp: , Rfl:   •  naltrexone (REVIA) 50 mg tablet, Take 1 tablet (50 mg total) by mouth daily, Disp: 30 tablet, Rfl: 1  •  venlafaxine (EFFEXOR-XR) 150 mg 24 hr capsule, Take 1 capsule by mouth daily, Disp: , Rfl:   •  escitalopram (Lexapro) 10 mg tablet, Take 1 tablet (10 mg total) by mouth daily Take one 10mg tab with one 5mg tab for a total daily dose of 15mg, Disp: 30 tablet, Rfl: 1  •  escitalopram (LEXAPRO) 5 mg tablet, Take 1 tablet (5 mg total) by mouth daily Take one 10mg tab with one 5mg tab for a total daily dose of 15mg, Disp: 30 tablet, Rfl: 1  •  folic acid (FOLVITE) 1 mg tablet, Take 1 tablet (1 mg total) by mouth daily, Disp: 30 tablet, Rfl: 0  •  thiamine (VITAMIN B1) 100 mg tablet, Take 1 tablet (100 mg total) by mouth daily, Disp: 30 tablet, Rfl: 0    Current Allergies     Allergies as of 2023 - Reviewed 2023   Allergen Reaction Noted   • Wellbutrin [bupropion] Facial Swelling 2015            The following portions of the patient's history were reviewed and updated as appropriate: allergies, current medications, past family history, past medical history, past social history, past surgical history and problem list.     Past Medical History:   Diagnosis Date   • Depression    • Shingles        Past Surgical History:   Procedure Laterality Date   •  SECTION     • DILATION AND CURETTAGE OF UTERUS         Family History   Problem Relation Age of Onset   • Osteoporosis Mother    • Hyperlipidemia Mother    • Heart disease Father    • Hypertension Father          Medications have been verified. Objective   /85   Pulse 86   Temp 97.8 °F (36.6 °C) (Temporal)   Resp 18   Ht 5' (1.524 m)   Wt 59 kg (130 lb)   LMP 2023   SpO2 99%   BMI 25.39 kg/m²        Physical Exam     Physical Exam  Constitutional:       Appearance: Normal appearance. Cardiovascular:      Heart sounds: Normal heart sounds. Pulmonary:      Effort: Pulmonary effort is normal.      Breath sounds: Normal breath sounds. Lymphadenopathy:      Cervical: No cervical adenopathy. Skin:     Comments: 2 fingertip sized erythematous telangiectasias symmetrically adjacent over lower thoracic spine. A few fading bruises on her anterior thigh   Neurological:      Mental Status: She is alert.